# Patient Record
Sex: FEMALE | Race: WHITE | Employment: OTHER | URBAN - METROPOLITAN AREA
[De-identification: names, ages, dates, MRNs, and addresses within clinical notes are randomized per-mention and may not be internally consistent; named-entity substitution may affect disease eponyms.]

---

## 2018-10-09 ENCOUNTER — HOSPITAL ENCOUNTER (OUTPATIENT)
Age: 63
Setting detail: SPECIMEN
Discharge: HOME | End: 2018-10-09

## 2018-10-09 PROCEDURE — 86850 RBC ANTIBODY SCREEN: CPT

## 2018-10-09 PROCEDURE — 86901 BLOOD TYPING SEROLOGIC RH(D): CPT

## 2018-10-09 PROCEDURE — 86900 BLOOD TYPING SEROLOGIC ABO: CPT

## 2021-02-05 ENCOUNTER — HOSPITAL ENCOUNTER (OUTPATIENT)
Dept: CT IMAGING | Age: 66
Discharge: HOME | End: 2021-02-05
Payer: COMMERCIAL

## 2021-02-05 ENCOUNTER — HOSPITAL ENCOUNTER (OUTPATIENT)
Dept: NUCLEAR MEDICINE | Age: 66
Discharge: HOME | End: 2021-02-05
Payer: COMMERCIAL

## 2021-02-05 ENCOUNTER — HOSPITAL ENCOUNTER (OUTPATIENT)
Age: 66
Discharge: HOME | End: 2021-02-05
Payer: COMMERCIAL

## 2021-02-05 DIAGNOSIS — C50.912 INVASIVE DUCTAL CARCINOMA OF BREAST, FEMALE, LEFT (HCC): ICD-10-CM

## 2021-02-05 LAB
ALBUMIN SERPL-MCNC: 4.4 GM/DL (ref 3.4–5)
ALP BLD-CCNC: 105 IU/L (ref 40–129)
ALT SERPL-CCNC: 19 U/L (ref 10–40)
ANION GAP SERPL CALCULATED.3IONS-SCNC: 10 MMOL/L (ref 4–16)
AST SERPL-CCNC: 22 IU/L (ref 15–37)
BILIRUB SERPL-MCNC: 0.3 MG/DL (ref 0–1)
BILIRUBIN DIRECT: 0.2 MG/DL (ref 0–0.3)
BILIRUBIN, INDIRECT: 0.1 MG/DL (ref 0–0.7)
BUN BLDV-MCNC: 16 MG/DL (ref 6–23)
CALCIUM SERPL-MCNC: 9.6 MG/DL (ref 8.3–10.6)
CHLORIDE BLD-SCNC: 101 MMOL/L (ref 99–110)
CO2: 26 MMOL/L (ref 21–32)
CREAT SERPL-MCNC: 0.8 MG/DL (ref 0.6–1.1)
GFR AFRICAN AMERICAN: >60 ML/MIN/1.73M2
GFR AFRICAN AMERICAN: >60 ML/MIN/1.73M2
GFR NON-AFRICAN AMERICAN: >60 ML/MIN/1.73M2
GFR NON-AFRICAN AMERICAN: >60 ML/MIN/1.73M2
GLUCOSE BLD-MCNC: 100 MG/DL (ref 70–99)
HCT VFR BLD CALC: 39.8 % (ref 37–47)
HEMOGLOBIN: 12.8 GM/DL (ref 12.5–16)
MCH RBC QN AUTO: 29.3 PG (ref 27–31)
MCHC RBC AUTO-ENTMCNC: 32.2 % (ref 32–36)
MCV RBC AUTO: 91.1 FL (ref 78–100)
PDW BLD-RTO: 13.6 % (ref 11.7–14.9)
PLATELET # BLD: 324 K/CU MM (ref 140–440)
PMV BLD AUTO: 9.2 FL (ref 7.5–11.1)
POC CREATININE: 0.8 MG/DL (ref 0.6–1.1)
POTASSIUM SERPL-SCNC: 4.6 MMOL/L (ref 3.5–5.1)
RBC # BLD: 4.37 M/CU MM (ref 4.2–5.4)
SODIUM BLD-SCNC: 137 MMOL/L (ref 135–145)
TOTAL PROTEIN: 7.2 GM/DL (ref 6.4–8.2)
WBC # BLD: 5.5 K/CU MM (ref 4–10.5)

## 2021-02-05 PROCEDURE — 82248 BILIRUBIN DIRECT: CPT

## 2021-02-05 PROCEDURE — 78306 BONE IMAGING WHOLE BODY: CPT

## 2021-02-05 PROCEDURE — 3430000000 HC RX DIAGNOSTIC RADIOPHARMACEUTICAL: Performed by: SURGERY

## 2021-02-05 PROCEDURE — 74177 CT ABD & PELVIS W/CONTRAST: CPT

## 2021-02-05 PROCEDURE — 80053 COMPREHEN METABOLIC PANEL: CPT

## 2021-02-05 PROCEDURE — 71260 CT THORAX DX C+: CPT

## 2021-02-05 PROCEDURE — 6360000004 HC RX CONTRAST MEDICATION: Performed by: EMERGENCY MEDICINE

## 2021-02-05 PROCEDURE — 36415 COLL VENOUS BLD VENIPUNCTURE: CPT

## 2021-02-05 PROCEDURE — A9503 TC99M MEDRONATE: HCPCS | Performed by: SURGERY

## 2021-02-05 PROCEDURE — 85027 COMPLETE CBC AUTOMATED: CPT

## 2021-02-05 RX ORDER — TC 99M MEDRONATE 20 MG/10ML
26.4 INJECTION, POWDER, LYOPHILIZED, FOR SOLUTION INTRAVENOUS
Status: COMPLETED | OUTPATIENT
Start: 2021-02-05 | End: 2021-02-05

## 2021-02-05 RX ADMIN — IOPAMIDOL 80 ML: 755 INJECTION, SOLUTION INTRAVENOUS at 13:31

## 2021-02-05 RX ADMIN — IOHEXOL 50 ML: 240 INJECTION, SOLUTION INTRATHECAL; INTRAVASCULAR; INTRAVENOUS; ORAL at 13:33

## 2021-02-05 RX ADMIN — TC 99M MEDRONATE 26.4 MILLICURIE: 20 INJECTION, POWDER, LYOPHILIZED, FOR SOLUTION INTRAVENOUS at 10:50

## 2021-03-29 NOTE — PROGRESS NOTES
Patient Name:  Aditya Painter  Patient :  1955  Patient MRN:  [id number]     Primary Oncologist: Malia Rivera MD  Referring Provider: Harold Phoenix     Date of Service: 3/31/2021      Reason for Consult:  Left breast cancer     Chief Complaint:    Chief Complaint   Patient presents with    New Patient      Patient Active Problem List:     Hyperlipemia     HPI:   Manju Woodruff is a pleasant 67-year-old female patient was referred for evaluation of left breast cancer. She is a nurse and plans to retire. She was found to have an abnormal screening mammogram to the left breast.  Previous mammogram was in 2016. Diagnostic mammogram with US on 2021 showed at 1 o'clock left breast there was an irregular hypoechoic mass with associated calcifications and posterior acoustic shadowing measuring 1.4.x1.1x1.4 cm. Impression: Highly suspicious mass 1 o'clock left breast and increased calcifications upper-outer quadrant left breast.    She had US guided biopsy on 2021. Pathology report showed invasive ductal carcinoma with focal LCIS at posterior lateral tumor and 1 o'clock tumor. CBC and CMP were unremarkable. CBC and CMP in 2021 were unremarkable, except for mildly elevated alkaline phosphatase at 116. CT chest, abdomen and pelvis on 2021 was negative for metastatic disease. Bone scan in 2021 was negative. She elected to have left breast mastectomy followed by reconstruction. She has left breast mastectomy with LN biopsy on 3/8/2021. Pathology report showed : no residual cancer on mastectomy specimen. Tumor focality: 2 sites. Tumor site: Posterior lateral.  Tumor size: 4 mm. Histologic type: Invasive ductal carcinoma. Histologic grade: Grade 1  ER by IHC was more than 95%, AZ by IHC was more than 95%, HER-2/mingo by IHC was 0. Tumor site: 1:00  Tumor size: 5 mm  Histologic type:  Invasive ductal carcinoma  Histologic grade: Grade 1  ER biopsy was more than 95%, AZ by IHC was 50%, HER-2/mingo by FISH was not amplified. Margins were clear. No lymphovascular invasion. [place] lymph nodes were negative. Pathological stage T1a, N0, MX. She has bone density in December 2020 by Dr Sandra Javier and has osteopenia. She was recommended to take vitamin D and calcium. We went over NCCN guideline which recommends to consider adjuvant endocrine therapy. She is agreeable to arimidex. I gave prescription of arimidex on March 31, 2021. We discussed about risk and benefit of arimidex. I recommend yearly mammogram and bone density every 2 years. She has 2 daughters. She is interested in genetic counseling. She has chronic osteoarthritic pain and been taking celebrex. She had positive COVID in 2020 and had COVID vaccine. Past Medical History:    Depression      GERD (gastroesophageal reflux disease)      Hyperlipidemia      Pap smear for cervical cancer screening 01/08/2009     Neg    Pap smear for cervical cancer screening 01/28/2010     Neg    Pap smear for cervical cancer screening 07/14/2011     Neg    Rectocele      Past Surgery History:     COLONOSCOPY   11/2008    DILATION AND CURETTAGE OF UTERUS   1985    JOINT REPLACEMENT Left 2019     partial knee    PELVIC LAPAROSCOPY   1985    TUBAL LIGATION   1991     Social History:   Tobacco Use    Smoking status: Former Smoker    Smokeless tobacco: Never Used   Substance Use Topics    Alcohol use: Yes    -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  She has her first grandchild who was born in July, 2020 as well    Family History:    Heart Failure Father      Heart Disease Father      High Cholesterol Father      Stroke Mother      Heart Failure Maternal Grandmother      Hypertension Maternal Grandmother      High Blood Pressure Maternal Grandmother      Cancer Paternal Grandmother      Diabetes Paternal Grandmother      Allergies:  No Known Allergies    Current Outpatient Medications on File Prior to Visit Medication Sig Dispense Refill    cephALEXin (KEFLEX) 500 MG capsule TAKE 1 CAPSULE BY MOUTH THREE TIMES DAILY      omeprazole (PRILOSEC) 40 MG delayed release capsule TAKE 1 CAPSULE BY MOUTH DAILY      HYDROcodone-acetaminophen (NORCO) 5-325 MG per tablet Take 1 tablet by mouth every 6 hours as needed for Pain for up to 7 days. Intended supply: 5 days. Take lowest dose possible to manage pain 28 tablet 0    celecoxib (CELEBREX) 200 MG capsule Take 200 mg by mouth 2 times daily      gabapentin (NEURONTIN) 300 MG capsule Take 300 mg by mouth 3 times daily.  atorvastatin (LIPITOR) 40 MG tablet       FLUoxetine (PROZAC) 20 MG capsule        No current facility-administered medications on file prior to visit. Review of Systems:    Constitutional:  No weight loss, No fever, No chills, No night sweats. Energy level good. Eyes:  No diplopia, No transient or permanent loss of vision, No scotomata. ENT / Mouth:  No epistaxis, No dysphagia, No hoarseness, No oral ulcers, No gingival bleeding. No sore throat, No postnasal drip, No nasal drip, No mouth pain, No sinus pain, No tinnitus, Normal hearing. Cardiovascular:  No chest pain, No palpitations, No syncope, No upper extremity edema, No lower extremity edema, No calf discomfort. Respiratory:  No cough. No hemoptysis, No pleurisy, No wheezing, No dyspnea. Breast:  No breast mass, No pain, No nipple discharge, No change in size, No change in shape. Gastrointestinal:  No abdominal pain, No abdominal cramping, No nausea, No vomiting, No constipation, No diarrhea, No hematochezia, No melena, No jaundice, No dyspepsia, No dysphagia. Urinary:  No dysuria, No hematuria, No urinary incontinence. Gynecological:  No vaginal discharge, No suprapubic pain, No abnormal vaginal bleeding. (Female Patients Only)  Musculoskeletal:  No muscle pain, No swollen joints, No joint redness, No bone pain, No spine tenderness.   Skin:  No rash, No nodules, No pruritus, No lesions. Neurologic:  No confusion, No seizures, No syncope, No tremor, No speech change, No headache, No hiccups, No abnormal gait, No sensory changes, No weakness. Psychiatric:  No depression, No anxiety, Concentration normal.  Endocrine:  No polyuria, No polydipsia, No hot flashes, No thyroid symptoms. Hematologic:  No epistaxis, No gingival bleeding, No petechiae, No ecchymosis. Lymphatic:  No lymphadenopathy, No lymphedema. Allergy / Immunologic:  No eczema, No frequent mucous infections, No frequent respiratory infections, No recurrent urticarial, No frequent skin infections. Vital Signs: /67 (Site: Right Upper Arm, Position: Sitting, Cuff Size: Large Adult)   Pulse 83   Temp 97.4 °F (36.3 °C) (Temporal)   Ht 5' 2\" (1.575 m)   Wt 186 lb 3.2 oz (84.5 kg)   SpO2 98%   BMI 34.06 kg/m²      Physical Exam:  CONSTITUTIONAL: awake, alert, cooperative, no apparent distress   EYES: pupils equal, round and reactive to light, sclera clear and conjunctiva normal  ENT: Normocephalic, without obvious abnormality, atraumatic  NECK: supple, symmetrical, no jugular venous distension and no carotid bruits   HEMATOLOGIC/LYMPHATIC: no cervical, supraclavicular or axillary lymphadenopathy   LUNGS: no increased work of breathing and clear to auscultation   BREAST: s/p left breast mastectomy with expander. Bruises noted. CARDIOVASCULAR: regular rate and rhythm, normal S1 and S2, no murmur noted  ABDOMEN: normal bowel sounds x 4, soft, non-distended, non-tender, no masses palpated, no hepatosplenomegaly   MUSCULOSKELETAL: full range of motion noted, tone is normal  NEUROLOGIC: awake, alert, oriented to name, place and time. Motor skills grossly intact. SKIN: Normal skin color, texture, turgor and no jaundice.  appears intact   EXTREMITIES: no LE edema        Labs:  Hematology:  Lab Results   Component Value Date    WBC 5.5 02/05/2021    RBC 4.37 02/05/2021    HGB 12.8 02/05/2021    HCT 39.8 02/05/2021    MCV 91.1 02/05/2021    MCH 29.3 02/05/2021    MCHC 32.2 02/05/2021    RDW 13.6 02/05/2021     02/05/2021    MPV 9.2 02/05/2021     Chemistry:  Lab Results   Component Value Date     02/05/2021    K 4.6 02/05/2021     02/05/2021    CO2 26 02/05/2021    BUN 16 02/05/2021    CREATININE 0.8 02/05/2021    GLUCOSE 100 (H) 02/05/2021    CALCIUM 9.6 02/05/2021    PROT 7.2 02/05/2021    LABALBU 4.4 02/05/2021    BILITOT 0.3 02/05/2021    ALKPHOS 105 02/05/2021    AST 22 02/05/2021    ALT 19 02/05/2021    LABGLOM >60 02/05/2021    GFRAA >60 02/05/2021     Immunology:  Lab Results   Component Value Date    PROT 7.2 02/05/2021      Observations:  PHQ-9 Total Score: 1 (3/31/2021 11:29 AM)     Assessment & Plan:  1. She has two small invasive ductal carcinoma of left breast, 4 mm and 5 mm, ER/DC positive and Her-mingo negative s/p mastectomy. She is agreeable to adjuvant arimidex   She will start on March 31, 2021. We discussed about survivorship. Clinically she is in remission. I recommend yearly mammogram.     2. She has osteopenia. Bone density was in December 2020.    3. I referred to Belén Diaz for genetic counseling. 4. She has osteoarthritis and takes celebrex. We discussed about healthy diet and life styel. She had Covid vaccine. RTC in 6 weeks or sooner. All of her questions have been answered for today. I have discussed the above stated plan with the patient and they verbalized understanding and agreed with the plan. Thank you for allowing us to participate in this patient's care.       Electronically signed by Quinn Roman MD on 3/29/21 at 7:19 AM EDT

## 2021-03-31 ENCOUNTER — INITIAL CONSULT (OUTPATIENT)
Dept: ONCOLOGY | Age: 66
End: 2021-03-31
Payer: COMMERCIAL

## 2021-03-31 ENCOUNTER — CLINICAL DOCUMENTATION (OUTPATIENT)
Dept: CASE MANAGEMENT | Age: 66
End: 2021-03-31

## 2021-03-31 ENCOUNTER — HOSPITAL ENCOUNTER (OUTPATIENT)
Dept: INFUSION THERAPY | Age: 66
Discharge: HOME | End: 2021-03-31
Payer: COMMERCIAL

## 2021-03-31 VITALS
DIASTOLIC BLOOD PRESSURE: 67 MMHG | BODY MASS INDEX: 34.27 KG/M2 | SYSTOLIC BLOOD PRESSURE: 138 MMHG | HEIGHT: 62 IN | HEART RATE: 83 BPM | TEMPERATURE: 97.4 F | OXYGEN SATURATION: 98 % | WEIGHT: 186.2 LBS

## 2021-03-31 DIAGNOSIS — C50.412 MALIGNANT NEOPLASM OF UPPER-OUTER QUADRANT OF LEFT BREAST IN FEMALE, ESTROGEN RECEPTOR POSITIVE (HCC): Primary | ICD-10-CM

## 2021-03-31 DIAGNOSIS — Z17.0 MALIGNANT NEOPLASM OF UPPER-OUTER QUADRANT OF LEFT BREAST IN FEMALE, ESTROGEN RECEPTOR POSITIVE (HCC): Primary | ICD-10-CM

## 2021-03-31 PROCEDURE — 99205 OFFICE O/P NEW HI 60 MIN: CPT | Performed by: INTERNAL MEDICINE

## 2021-03-31 PROCEDURE — 99202 OFFICE O/P NEW SF 15 MIN: CPT

## 2021-03-31 RX ORDER — OMEPRAZOLE 40 MG/1
CAPSULE, DELAYED RELEASE ORAL
COMMUNITY
Start: 2021-03-15

## 2021-03-31 RX ORDER — CEPHALEXIN 500 MG/1
CAPSULE ORAL
COMMUNITY
Start: 2021-03-19 | End: 2021-04-14 | Stop reason: ALTCHOICE

## 2021-03-31 RX ORDER — ANASTROZOLE 1 MG/1
1 TABLET ORAL DAILY
Qty: 90 TABLET | Refills: 1 | Status: SHIPPED | OUTPATIENT
Start: 2021-03-31 | End: 2021-05-14 | Stop reason: SDUPTHER

## 2021-03-31 ASSESSMENT — PATIENT HEALTH QUESTIONNAIRE - PHQ9
SUM OF ALL RESPONSES TO PHQ9 QUESTIONS 1 & 2: 1
2. FEELING DOWN, DEPRESSED OR HOPELESS: 1
SUM OF ALL RESPONSES TO PHQ QUESTIONS 1-9: 1
1. LITTLE INTEREST OR PLEASURE IN DOING THINGS: 0
SUM OF ALL RESPONSES TO PHQ QUESTIONS 1-9: 1

## 2021-03-31 NOTE — PROGRESS NOTES
[place] Rooming Questions  Patient: Martine Carrion  MRN: [id number]    Date: 3/31/2021      NEW PATIENT     5. Did the patient have a depression screening completed today?  Yes    PHQ-9 Total Score: 1 (3/31/2021 11:29 AM)       PHQ-9 Given to (if applicable):               PHQ-9 Score (if applicable):                     [] Positive     []  Negative              Does question #9 need addressed (if applicable)                     [] Yes    []  No               Shane De Souza MA

## 2021-03-31 NOTE — PROGRESS NOTES
Patient here today for consult with Dr. Barrera Babcock. Patient recently diagnosed with early stage left breast invasive ductal carcinoma, ER/DC positive, HER-2/mingo negative by FISH. Patient had a left mastectomy/SN bxs on 3/8/21 which showed no residual cancer and benign lymph nodes. No chemotherapy or radiation recommended. Patient will be starting on an aromatase inhibitor. Per Dr. Cami Conley, Arimidex 1 mg one tab daily e-scribed to patient's pharmacy. Side effects reviewed with patient and educational printout given. Direct contact information given to call with any questions/concerns. Patient voiced understanding of all.

## 2021-04-14 ENCOUNTER — HOSPITAL ENCOUNTER (OUTPATIENT)
Age: 66
Setting detail: SPECIMEN
Discharge: HOME | End: 2021-04-14
Payer: COMMERCIAL

## 2021-04-14 PROCEDURE — 87077 CULTURE AEROBIC IDENTIFY: CPT

## 2021-04-14 PROCEDURE — 87075 CULTR BACTERIA EXCEPT BLOOD: CPT

## 2021-04-14 PROCEDURE — 87070 CULTURE OTHR SPECIMN AEROBIC: CPT

## 2021-04-14 PROCEDURE — 87186 SC STD MICRODIL/AGAR DIL: CPT

## 2021-04-15 NOTE — PROGRESS NOTES
Patient Name:  Nabila Posadas  Patient :  1955  Patient MRN:  [id number]     Primary Oncologist: Eddie Moreau MD  Referring Provider: Jaya Potts     Date of Service: 2021      Chief Complaint:    No chief complaint on file. She came in for follow-up visit. Patient Active Problem List:     Hyperlipemia     HPI:   Michele Jimenez is a pleasant 44-year-old female patient was referred for evaluation of left breast cancer. She is a nurse and plans to retire. She was found to have an abnormal screening mammogram to the left breast.  Previous mammogram was in 2016. Diagnostic mammogram with US on 2021 showed at 1 o'clock left breast there was an irregular hypoechoic mass with associated calcifications and posterior acoustic shadowing measuring 1.4.x1.1x1.4 cm. Impression: Highly suspicious mass 1 o'clock left breast and increased calcifications upper-outer quadrant left breast.    She had US guided biopsy on 2021. Pathology report showed invasive ductal carcinoma with focal LCIS at posterior lateral tumor and 1 o'clock tumor. CBC and CMP were unremarkable. CBC and CMP in 2021 were unremarkable, except for mildly elevated alkaline phosphatase at 116. CT chest, abdomen and pelvis on 2021 was negative for metastatic disease. Bone scan in 2021 was negative. She elected to have left breast mastectomy followed by reconstruction. She has left breast mastectomy with LN biopsy on 3/8/2021. Pathology report showed : no residual cancer on mastectomy specimen. Tumor focality: 2 sites. Tumor site: Posterior lateral.  Tumor size: 4 mm. Histologic type: Invasive ductal carcinoma. Histologic grade: Grade 1  ER by IHC was more than 95%, ND by IHC was more than 95%, HER-2/mingo by IHC was 0. Tumor site: 1:00  Tumor size: 5 mm  Histologic type:  Invasive ductal carcinoma  Histologic grade: Grade 1  ER biopsy was more than 95%, ND by IHC was 50%, HER-2/mingo by FISH was not amplified. Margins were clear. No lymphovascular invasion. [place] lymph nodes were negative. Pathological stage T1a, N0, MX. She has bone density in December 2020 by Dr Bonnie Phillips and has osteopenia. She was recommended to take vitamin D and calcium. We went over NCCN guideline which recommends to consider adjuvant endocrine therapy. She is agreeable to arimidex. I gave prescription of arimidex on March 31, 2021. We discussed about risk and benefit of arimidex. I recommend yearly mammogram and bone density every 2 years. She has 2 daughters. She is interested in genetic counseling. She has chronic osteoarthritic pain and been taking celebrex. She had positive COVID in 2020 and had COVID vaccine. On May 14, 2021 she came in for follow-up visit. She delay her appointment to see Robinson Puckett to discuss about genetic counseling due to the removal of breast implant. She will reschedule it. She had surgery 3 times to her breast prior to next office visit which may explain her anemia. I will have anemic work-up prior to next office visit. She has hot flashes. So far she has been tolerating Arimidex well. I will give refill of the Arimidex. She plans to go to [place] for vacation. She denied any nausea, vomiting or diarrhea. No fever or chills. No dysuria or hematuria. No headaches or dizzy spell.     Past Medical History:    Depression      GERD (gastroesophageal reflux disease)      Hyperlipidemia      Pap smear for cervical cancer screening 01/08/2009     Neg    Pap smear for cervical cancer screening 01/28/2010     Neg    Pap smear for cervical cancer screening 07/14/2011     Neg    Rectocele      Past Surgery History:     COLONOSCOPY   11/2008    DILATION AND CURETTAGE OF UTERUS   1985    JOINT REPLACEMENT Left 2019     partial knee    PELVIC LAPAROSCOPY   1985    TUBAL LIGATION   1991     Social History:   Tobacco Use    Smoking status: Former Smoker    Smokeless tobacco: Never Used   Substance Use Topics    Alcohol use: Yes    -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 She has her first grandchild who was born in July, 2020 as well    Family History:    Heart Failure Father      Heart Disease Father      High Cholesterol Father      Stroke Mother      Heart Failure Maternal Grandmother      Hypertension Maternal Grandmother      High Blood Pressure Maternal Grandmother      Cancer Paternal Grandmother      Diabetes Paternal Grandmother       Review of Systems: The remainder of the review of system is unremarkable. Vital Signs: There were no vitals taken for this visit. Physical Exam:  CONSTITUTIONAL: awake, alert, cooperative, no apparent distress   EYES: pupils equal, round and reactive to light, sclera clear and conjunctiva normal  ENT: Normocephalic, without obvious abnormality, atraumatic  NECK: supple, symmetrical, no jugular venous distension and no carotid bruits   HEMATOLOGIC/LYMPHATIC: no cervical, supraclavicular or axillary lymphadenopathy   LUNGS: no increased work of breathing and clear to auscultation   BREAST: s/p left breast mastectomy  CARDIOVASCULAR: regular rate and rhythm, normal S1 and S2, no murmur noted  ABDOMEN: normal bowel sounds, soft, non-distended, non-tender, no masses palpated, no hepatosplenomegaly   MUSCULOSKELETAL: full range of motion noted, tone is normal  NEUROLOGIC: awake, alert, oriented to name, place and time. Motor skills grossly intact. Cranial nerves II through XII grossly intact. SKIN: Normal skin color, texture, turgor and no jaundice. appears intact   EXTREMITIES: no LE edema  or cyanosis.      Labs:  Hematology:  Lab Results   Component Value Date    WBC 5.5 02/05/2021    RBC 4.37 02/05/2021    HGB 12.8 02/05/2021    HCT 39.8 02/05/2021    MCV 91.1 02/05/2021    MCH 29.3 02/05/2021    MCHC 32.2 02/05/2021    RDW 13.6 02/05/2021     02/05/2021    MPV 9.2 02/05/2021     Chemistry:  Lab Results   Component Value Date     02/05/2021    K 4.6 02/05/2021     02/05/2021    CO2 26 02/05/2021    BUN 16 02/05/2021    CREATININE 0.8 02/05/2021    GLUCOSE 100 (H) 02/05/2021    CALCIUM 9.6 02/05/2021    PROT 7.2 02/05/2021    LABALBU 4.4 02/05/2021    BILITOT 0.3 02/05/2021    ALKPHOS 105 02/05/2021    AST 22 02/05/2021    ALT 19 02/05/2021    LABGLOM >60 02/05/2021    GFRAA >60 02/05/2021     Immunology:  Lab Results   Component Value Date    PROT 7.2 02/05/2021      Observations:  No data recorded     Assessment & Plan:  1. She has two small invasive ductal carcinoma of left breast, 4 mm and 5 mm, ER/W[place] positive and Her-mingo negative s/p mastectomy. She is agreeable to adjuvant arimidex   She started Arimidex on March 31, 2021. No grade 3 toxicity. Clinically she is in remission. I recommend yearly mammogram.  I gave refill for Arimidex. 2. She has osteopenia. Bone density was in December 2020. She will take vitamin D and calcium. 3. I referred to Robinson Puckett for genetic counseling. 4. She has osteoarthritis and takes celebrex. We discussed about healthy diet and life styel. She had Covid vaccine. RTC in 12 weeks or sooner. All of her questions have been answered for today.      Electronically signed by Damián Bae MD on 3/29/21 at 7:19 AM LOIDAT

## 2021-04-20 LAB
CULTURE: ABNORMAL
CULTURE: ABNORMAL
Lab: ABNORMAL
SPECIMEN: ABNORMAL

## 2021-05-07 ENCOUNTER — HOSPITAL ENCOUNTER (OUTPATIENT)
Dept: INFUSION THERAPY | Age: 66
Discharge: HOME | End: 2021-05-07
Payer: COMMERCIAL

## 2021-05-07 DIAGNOSIS — Z17.0 MALIGNANT NEOPLASM OF UPPER-OUTER QUADRANT OF LEFT BREAST IN FEMALE, ESTROGEN RECEPTOR POSITIVE (HCC): ICD-10-CM

## 2021-05-07 DIAGNOSIS — C50.412 MALIGNANT NEOPLASM OF UPPER-OUTER QUADRANT OF LEFT BREAST IN FEMALE, ESTROGEN RECEPTOR POSITIVE (HCC): ICD-10-CM

## 2021-05-07 LAB
ALBUMIN SERPL-MCNC: 4.7 GM/DL (ref 3.4–5)
ALP BLD-CCNC: 107 IU/L (ref 40–129)
ALT SERPL-CCNC: 16 U/L (ref 10–40)
ANION GAP SERPL CALCULATED.3IONS-SCNC: 8 MMOL/L (ref 4–16)
AST SERPL-CCNC: 21 IU/L (ref 15–37)
BASOPHILS ABSOLUTE: 0 K/CU MM
BASOPHILS RELATIVE PERCENT: 0.5 % (ref 0–1)
BILIRUB SERPL-MCNC: 0.3 MG/DL (ref 0–1)
BUN BLDV-MCNC: 17 MG/DL (ref 6–23)
CALCIUM SERPL-MCNC: 10.3 MG/DL (ref 8.3–10.6)
CHLORIDE BLD-SCNC: 99 MMOL/L (ref 99–110)
CO2: 29 MMOL/L (ref 21–32)
CREAT SERPL-MCNC: 0.8 MG/DL (ref 0.6–1.1)
DIFFERENTIAL TYPE: ABNORMAL
EOSINOPHILS ABSOLUTE: 0.1 K/CU MM
EOSINOPHILS RELATIVE PERCENT: 1.2 % (ref 0–3)
GFR AFRICAN AMERICAN: >60 ML/MIN/1.73M2
GFR NON-AFRICAN AMERICAN: >60 ML/MIN/1.73M2
GLUCOSE BLD-MCNC: 118 MG/DL (ref 70–99)
HCT VFR BLD CALC: 32.6 % (ref 37–47)
HEMOGLOBIN: 10.4 GM/DL (ref 12.5–16)
LYMPHOCYTES ABSOLUTE: 1.8 K/CU MM
LYMPHOCYTES RELATIVE PERCENT: 31.3 % (ref 24–44)
MCH RBC QN AUTO: 27.3 PG (ref 27–31)
MCHC RBC AUTO-ENTMCNC: 31.9 % (ref 32–36)
MCV RBC AUTO: 85.6 FL (ref 78–100)
MONOCYTES ABSOLUTE: 0.6 K/CU MM
MONOCYTES RELATIVE PERCENT: 10.5 % (ref 0–4)
PDW BLD-RTO: 13.6 % (ref 11.7–14.9)
PLATELET # BLD: 329 K/CU MM (ref 140–440)
PMV BLD AUTO: 8.8 FL (ref 7.5–11.1)
POTASSIUM SERPL-SCNC: 4.8 MMOL/L (ref 3.5–5.1)
RBC # BLD: 3.81 M/CU MM (ref 4.2–5.4)
SEGMENTED NEUTROPHILS ABSOLUTE COUNT: 3.2 K/CU MM
SEGMENTED NEUTROPHILS RELATIVE PERCENT: 56.5 % (ref 36–66)
SODIUM BLD-SCNC: 136 MMOL/L (ref 135–145)
TOTAL PROTEIN: 7 GM/DL (ref 6.4–8.2)
WBC # BLD: 5.7 K/CU MM (ref 4–10.5)

## 2021-05-07 PROCEDURE — 85025 COMPLETE CBC W/AUTO DIFF WBC: CPT

## 2021-05-07 PROCEDURE — 36415 COLL VENOUS BLD VENIPUNCTURE: CPT

## 2021-05-07 PROCEDURE — 80053 COMPREHEN METABOLIC PANEL: CPT

## 2021-05-14 ENCOUNTER — OFFICE VISIT (OUTPATIENT)
Dept: ONCOLOGY | Age: 66
End: 2021-05-14
Payer: COMMERCIAL

## 2021-05-14 ENCOUNTER — HOSPITAL ENCOUNTER (OUTPATIENT)
Dept: INFUSION THERAPY | Age: 66
Discharge: HOME | End: 2021-05-14
Payer: COMMERCIAL

## 2021-05-14 VITALS
TEMPERATURE: 96.4 F | SYSTOLIC BLOOD PRESSURE: 142 MMHG | WEIGHT: 185.8 LBS | OXYGEN SATURATION: 97 % | DIASTOLIC BLOOD PRESSURE: 67 MMHG | HEIGHT: 62 IN | BODY MASS INDEX: 34.19 KG/M2 | HEART RATE: 105 BPM

## 2021-05-14 DIAGNOSIS — D64.9 ANEMIA, UNSPECIFIED TYPE: Primary | ICD-10-CM

## 2021-05-14 PROCEDURE — 99213 OFFICE O/P EST LOW 20 MIN: CPT | Performed by: INTERNAL MEDICINE

## 2021-05-14 PROCEDURE — 99211 OFF/OP EST MAY X REQ PHY/QHP: CPT

## 2021-05-14 RX ORDER — ANASTROZOLE 1 MG/1
1 TABLET ORAL DAILY
Qty: 90 TABLET | Refills: 1 | Status: SHIPPED | OUTPATIENT
Start: 2021-05-14 | End: 2021-11-22 | Stop reason: SDUPTHER

## 2021-05-14 ASSESSMENT — PATIENT HEALTH QUESTIONNAIRE - PHQ9
SUM OF ALL RESPONSES TO PHQ QUESTIONS 1-9: 1
SUM OF ALL RESPONSES TO PHQ9 QUESTIONS 1 & 2: 1

## 2021-05-14 NOTE — PROGRESS NOTES
[place] Rooming Questions  Patient: Tomer Mcintosh  MRN: [id number]    Date: 5/14/2021        1. Do you have any new issues? yes - patient had her breast implant removed @[facility]     2. Do you need any refills on medications?    no    3. Have you had any imaging done since your last visit? yes - 05/7      4. Have you been hospitalized or seen in the emergency room since your last visit here?   no    5. Did the patient have a depression screening completed today?  Yes    PHQ-9 Total Score: 1 (5/14/2021 10:48 AM)       PHQ-9 Given to (if applicable):               PHQ-9 Score (if applicable):                     [] Positive     []  Negative              Does question #9 need addressed (if applicable)                     [] Yes    []  No               Caty Ambrose CMA

## 2021-06-09 DIAGNOSIS — C50.412 MALIGNANT NEOPLASM OF UPPER-OUTER QUADRANT OF LEFT BREAST IN FEMALE, ESTROGEN RECEPTOR POSITIVE (HCC): Primary | ICD-10-CM

## 2021-06-09 DIAGNOSIS — Z17.0 MALIGNANT NEOPLASM OF UPPER-OUTER QUADRANT OF LEFT BREAST IN FEMALE, ESTROGEN RECEPTOR POSITIVE (HCC): Primary | ICD-10-CM

## 2021-06-28 ENCOUNTER — INITIAL CONSULT (OUTPATIENT)
Dept: ONCOLOGY | Age: 66
End: 2021-06-28
Payer: MEDICARE

## 2021-06-28 ENCOUNTER — HOSPITAL ENCOUNTER (OUTPATIENT)
Dept: INFUSION THERAPY | Age: 66
Discharge: HOME | End: 2021-06-28
Payer: MEDICARE

## 2021-06-28 VITALS
TEMPERATURE: 96.8 F | BODY MASS INDEX: 32.57 KG/M2 | OXYGEN SATURATION: 97 % | WEIGHT: 183.8 LBS | HEART RATE: 106 BPM | HEIGHT: 63 IN | SYSTOLIC BLOOD PRESSURE: 134 MMHG | DIASTOLIC BLOOD PRESSURE: 91 MMHG

## 2021-06-28 DIAGNOSIS — C50.412 MALIGNANT NEOPLASM OF UPPER-OUTER QUADRANT OF LEFT BREAST IN FEMALE, ESTROGEN RECEPTOR POSITIVE (HCC): Primary | ICD-10-CM

## 2021-06-28 DIAGNOSIS — Z17.0 MALIGNANT NEOPLASM OF UPPER-OUTER QUADRANT OF LEFT BREAST IN FEMALE, ESTROGEN RECEPTOR POSITIVE (HCC): Primary | ICD-10-CM

## 2021-06-28 PROCEDURE — G8427 DOCREV CUR MEDS BY ELIG CLIN: HCPCS | Performed by: NURSE PRACTITIONER

## 2021-06-28 PROCEDURE — 1090F PRES/ABSN URINE INCON ASSESS: CPT | Performed by: NURSE PRACTITIONER

## 2021-06-28 PROCEDURE — G8400 PT W/DXA NO RESULTS DOC: HCPCS | Performed by: NURSE PRACTITIONER

## 2021-06-28 PROCEDURE — G8417 CALC BMI ABV UP PARAM F/U: HCPCS | Performed by: NURSE PRACTITIONER

## 2021-06-28 PROCEDURE — 1036F TOBACCO NON-USER: CPT | Performed by: NURSE PRACTITIONER

## 2021-06-28 PROCEDURE — 1123F ACP DISCUSS/DSCN MKR DOCD: CPT | Performed by: NURSE PRACTITIONER

## 2021-06-28 PROCEDURE — 3017F COLORECTAL CA SCREEN DOC REV: CPT | Performed by: NURSE PRACTITIONER

## 2021-06-28 PROCEDURE — 99215 OFFICE O/P EST HI 40 MIN: CPT | Performed by: NURSE PRACTITIONER

## 2021-06-28 PROCEDURE — 4040F PNEUMOC VAC/ADMIN/RCVD: CPT | Performed by: NURSE PRACTITIONER

## 2021-06-28 NOTE — PROGRESS NOTES
[facility]     Cancer Treatment Summary and  Survivorship Care Plan    Provided by CRYSTAL Varma on 06/28/21        This Cancer Treatment Summary/Survivorship Care Plan is a brief record of major aspects of your cancer treatment. The care plan provides a way for a cancer survivor to review and retain information about their cancer, cancer treatment, and follow-up care. The care plan is also meant to provide basic information about a survivor's care to future healthcare providers. You can share your copy with any of your doctors or nurses; however, this is not a detailed or comprehensive record of your care. General Information   Patient Name John Paul Nunez    Patient ID [id number]    Address [place] [address]   Phone [phone] (home) [phone] (work)   Date of Birth 1955    Age 73 y.o. Support Contact Extended Emergency Contact Information  Primary Emergency Contact: Manuel Bui  Address: [address] [place] [address] Phone: [phone]  Mobile Phone: [phone]  Relation: [facility]   Medical Oncologist Dr. El Shepherd - Phone:  [phone]   Radiation Oncologist n/a   Surgeon   Dr. Patrick Morgan - Phone:  [phone]   Primary Care Physician Tim, Phone:  [phone]   Nurse Navigator Caridad Aguirre RN, BSN, CN-BN, Nurse Navigator - Phone:  [phone]        Cancer Diagnosis Information    Cancer Type/Location       Histologic Subtype/Pathology Results   Left breast invasive ductal carcinoma, focal lobular carcinoma in situ, grade 1, estrogen positive, progesterone positive, HER-2 negative. Diagnosis Date   1/15/21   Age at Diagnosis 60 y.o.    Stage at Diagnosis   Cancer Staging:    Malignant neoplasm of upper-outer quadrant of left breast in female, estrogen receptor positive ([facility] [address]  Staging form:  Breast, AJCC 8th Edition   - Pathological:  Stage IA (pT1a, pN0, pMX, G1, ER+,        ND+, HER-2-)     Surgical Procedure/Location/  Findings   Left breast biopsy done on 1/15/21 revealed invasive ductal carcinoma and focal lobular carcinoma in situ, ER/ND+, HER-2 negative. Left mastectomy with sentinel lymph node biopsies and immediate reconstruction done on 3/8/21 at [facility].  Final pathology revealed no residual carcinoma, two sites (4 mm and 5 mm), both grade 1, ER/ND positive, HER-2 negative. Margins were uninvolved and lymph nodes were negative.          Background Information  NCCN BREAST GUIDELINES         []  Patient has already completed genetic counseling  [x]  Patient needs to be screened for genetics below     NCCN Guidelines for Genetic Testing     Single Indication     Breast Cancer (Including DCIS) diagnosis ? age 39                      [] Yes                 [x] No  Triple Negative Breast Cancer diagnosed ? age 62                       [] Yes                 [x] No  HER2 - negative metastatic breast cancer diagnosed at any age  [] Yes                 [x] No  Male breast cancer diagnosed at any age                                      [] Yes                 [x] No  A known mutation in the family                                                       [] Yes                 [x] No  Ashkenazi Adventism                                                                           [] Yes                 [x] No  BRCA1 or 2 mutation detected on tumor testing                            [] Yes                 [x] No     Family History Indication     Breast Cancer diagnosis ? age 48                                                 [] Yes                 [x] No                AND an additional breast cancer primary                           [] Yes                OR an unknown or limited family history (adopted)            [] Yes                OR One or more relatives with any of the following:          [] Yes              Breast Cancer, Prostate [place] Score ? 7     Breast cancer diagnosis at any age                                               [x] Yes                 [] No                AND One or more relatives with any of the following:        [] Yes              Breast cancer ? age 48, Ovarian cancer at any age, Male              Breast cancer at any age, Pancreatic cancer, metastatic              Prostate cancer                OR Two additional diagnosis of breast cancer at any        [] Yes              Age in patient and/or in close blood relatives     Results of NCCN Guidelines     Patient should be referred for genetic counseling                    [] Yes                 [x] No    Has this patient had genetic testing previously completed? [] Yes                 [x] No       *NCCN Guidelines for Genetic Testing     (v3.2019 BRCA-related Breast and/or Ovarian cancer syndrome (BRCA 1/2 Testing Criteria) BRCA 1 or 2 mutation detected on tumor testing)   Genetic/Hereditary Risk Factor(s) or Predisposing Conditions   Cancer-related family history includes Cancer in her paternal grandmother.    Genetic Counseling Performed?   no   Genetic Testing Results   n/a   Medical History   Past Medical History:   Diagnosis Date    Arthritis     Depression     GERD (gastroesophageal reflux disease)     Hyperlipidemia     Pap smear for cervical cancer screening 01/08/2009    Neg    Pap smear for cervical cancer screening 01/28/2010    Neg    Pap smear for cervical cancer screening 07/14/2011    Neg    Rectocele       Surgical History    Past Surgical History:   Procedure Laterality Date    BREAST BIOPSY      COLONOSCOPY  11/2008    DILATION AND CURETTAGE OF UTERUS  1985    JOINT REPLACEMENT Left 2019    partial knee    KNEE SURGERY      MASTECTOMY, PARTIAL Left 03/08/2021    PELVIC LAPAROSCOPY  1985    TUBAL LIGATION  1991        Treatment Summary CHEMOTHERAPY no   [No treatment plan]  Treatment Regimen   Not recommended. Dates of Treatment    n/a   Cumulative Dose Lifetime Dose Tracking   N/A         RADIATION no     Anatomical Area Treated by Radiation    Not recommended - had mastectomy. Dates of Treatment    n/a     SURGERY/CHEMOTHERAPY/  RADIATION      Treatment-related hospitalization required?   no     Ongoing toxicity or side-effects of all treatments received (surgery, systemic therapy and/or radiation) at the completion of treatment. n/a     Information concerning the likely course of recovery from these toxicities. n/a       Ongoing Treatment    MEDICATION ALLERGIES No Known Allergies   CURRENT MEDICATION LIST       Current Outpatient Medications   Medication Sig Dispense Refill    anastrozole (ARIMIDEX) 1 MG tablet Take 1 tablet by mouth daily 90 tablet 1    Mastectomy Bra MISC by Does not apply route 4 each 0    Breast Prosthesis MISC by Does not apply route 1 each 0    omeprazole (PRILOSEC) 40 MG delayed release capsule TAKE 1 CAPSULE BY MOUTH DAILY      celecoxib (CELEBREX) 200 MG capsule Take 200 mg by mouth 2 times daily      gabapentin (NEURONTIN) 300 MG capsule Take 300 mg by mouth 3 times daily.  atorvastatin (LIPITOR) 40 MG tablet       FLUoxetine (PROZAC) 20 MG capsule        No current facility-administered medications for this visit. Need for Ongoing (Adjuvant) Treatment for Cancer?   yes - adjuvant endocrine therapy   Additional treatment name Planned duration Possible Side effects   Arimidex (anastrozole) 5 years or as directed by Medical Oncologist Possible Side Effects of Anastrozole (Arimidex):  Side effects may include weakness, fatigue, headache, mood swings, depression, nausea, mild diarrhea, increased or decreased appetite, sweating, hot flashes, vaginal dryness, temporary hair thinning, joint pain, bone pain and weakness, and lower bone density.       Follow-Up Care Plan    Continue all standard non-cancer related health care with your Primary Care Provider. Any new, unusual and/or persistent symptoms should be brought to the attention of your provider. Primary Care Physician Tim, [phone]    Coordinating Provider When / How Often? Medical Oncology   Visits    Dr. Joe Mcleod  Phone:  [phone] Every 3-6 months for 3 years, then every 6-12 months until 5 years or completion of therapy. These visits may be alternated with your medical, surgical, and/or radiation oncologist.    Next appointment day/time is 8/13/21 at 11:15 am.   General Surgeon   Visits    Dr. Ovidio Clark  Phone:  [phone] Next appointment day/time is 9/22/21 at 1:00 pm.                Cancer Surveillance or Other                 Recommended Tests When/Where Scheduled? Lab Tests   As indicated by Medical Oncologist. As indicated by provider. Imaging   Mammogram - Annually or as stated per oncology team.    Pap/Pelvic Exam - As indicated by provider. Colonoscopy - As indicated by provider. Bone Density - Every 2 years if on an aromatase inhibitor or as indicated by your provider. Needs or Concerns for Survivors After Treatment   Additional Services Offered:  Cancer survivors may experience issues with the areas listed below. If you have any concerns in these or other areas, please speak with your doctors or nurses to find out how you can get help with them.  -emotional and mental health      -fatigue          -weight changes        -stopping smoking  -physical functioning                    -insurance       -school/work             -parenting  -financial advice or assistance     -fertility                     -sexual functioning  -memory or concentration loss       Healthy Lifestyle Changes:  A number of lifestyle/behaviors can affect your on-going health, including the risk for the cancer coming back or developing another cancer.   Discuss these recommendations with your doctor important, so you should tell your doctor if you experience any of these symptoms. RECURRENT BREAST CANCER:    The goal of treating early and locally advanced breast cancer is to remove the cancer and keep it from coming back (breast cancer recurrence). Most people diagnosed with breast cancer will never have a breast cancer recurrence (return of breast cancer). However, everyone who has had breast cancer is at risk of recurrence. The risk of recurrence varies greatly from person to person. Talk with your healthcare provider about your risk of breast cancer recurrence and things that may lower your risk. Recurrence can be local (in the same breast or in the surgery scar), regional (in nearby lymph nodes), or in a distant area as described below:    Local Recurrence:  A local breast cancer recurrence means the cancer has come back in the same breast.  Signs and symptoms of local recurrence within the same breast may include:  -a new lump in your breast or irregular area of firmness  -a new area of the breast that seems unnaturally firm  -redness or swelling of the skin in or around the breast area   -flattening or other changes to the nipple including nipple discharge  -bumps on or under the skin of the chest wall   -new pulling of skin or swelling at the lumpectomy site   -a new thickening on or near the mastectomy scar      If you had a mastectomy and had your breast reconstructed, you may get harmless lumps caused by a build-up of scar tissue or dead fat cells in the reconstructed breast. These types of lumps aren't cancer. Still, your doctor needs to know about any lumps you feel in your breast so they can be monitored for any change in size or tenderness. Since mastectomy and reconstruction usually removes all of the breast tissue and replaces it with other tissue and/or an implant, mammogram is not usually recommended for reconstructed breasts.  Your doctor can monitor any new lumps on a reconstructed breast by performing a clinical breast exam. He or she may also recommend additional screening methods such as MRI. Regional Recurrence:  A regional breast cancer recurrence means the cancer has come back in the nearby lymph nodes. Signs and symptoms of regional recurrence may include:  -a lump or swelling in the lymph nodes under the arm, above the collarbone, near the breastbone, or in your neck   -swelling in the arm on the same side where the breast cancer was first found   -constant pain in the arm and shoulder   -loss of feeling in the arm and shoulder  -constant pain in the chest   -problems swallowing    Distant Recurrence:  A distant (metastatic) recurrence means the cancer has traveled to distant parts of the body, most commonly the bones, liver, lungs, and brain. Signs and symptoms of distant recurrence may include:  -persistent and worsening pain, such as chest or bone pain  -numbness or weakness anywhere in the body  -persistent cough  -difficulty breathing  -loss of appetite  -constant nausea  -weight loss  -severe headaches  -seizures  -vision problems  -loss of balance  -confusion    Contact your medical/radiation oncologists, surgeon, or PCP if you need assistance in these areas of concern or if you experience any new, unusual, or persistent symptoms. Community Resources   *www.[employer]/locations/specialty-locations/cancer-care-oncology/[facility]-     [place]  *www. [employer]/location/[facility]/  *http://bcefofclarkcounty.org/  *www. [employer]bongirls. org/  *Contact Siri Gomez, Psychosocial Coordinator, at [phone] for support group information     offered at [facility] and within the community. *Contact Tianna Espinoza RN, BSN, CN-BN, Breast Health Navigator, at [phone] for any     additional questions/concerns. Survivorship Patient Resources   *American Cancer Society (Martha Efrem. org/SurvivorshipCenter)  *Cancer Survivors Network (csn.cancer. org)  *https://www.Cause.it.[employer]/. org/  *Carolina Harrisonfatoumata (https://OhLife. org)  *LIVESTRONG (www.livestrong. org)  [address] (www.survivorship.cancer.gov/springboard)  [facility] [employer] (www.canceradBCD Semiconductor Holding. org)   *[address]       (www.nccn.org/patients/resources/life_after_cancer/)  *Dr. Vicki Gutierres (https://vidyanloveresearch. org)  *Rod (https://sharsheret. org)  *[facility] (NameImpressions.com.. org)       Tobacco Use:   Never smoker     Alcohol Use: Educated patient to limit alcohol intake. ECO     Pain Level: n/a     Psychological Screening:   (X) Yes ( ) No   Follow up plan:      Distress Screening completed: sadness, worry,   (X) Yes ( ) No   Comments (If applicable): Advance Directives completed: paperwork given  ( ) Yes ( ) No   Comments (if applicable):      Patient educated on follow-up appointments and expectations:   (X) Yes  ( ) No    REFERRALS:   Does this patient need a referral to PT/OT/ST or other outpatient services? (X) Yes (  ) No   Comments (If applicable): ELIANA Gould     Other referrals:  ( ) Yes (X) No   Comments (If applicable): Today, time spent with the patient discussing the intent and schedule of their treatment. The patient was given a copy of their treatment summary. Questions and concerns were addressed with the patient. Time spent with the patient face to face today was 60 minutes, counseling and coordination of care dominated more than 50% of this patient encounter.

## 2021-06-30 ENCOUNTER — CLINICAL DOCUMENTATION (OUTPATIENT)
Dept: RADIATION ONCOLOGY | Age: 66
End: 2021-06-30

## 2021-06-30 NOTE — CARE COORDINATION
Pt referred to [facility] counselor Johanna Calvin, [facility], for supportive counseling services.

## 2021-07-14 NOTE — PROGRESS NOTES
Patient Name:  Jose Hernandez  Patient :  1955  Patient MRN:  [id number]     Primary Oncologist: Angelo Vargas MD  Referring Provider: Angela Degroot     Date of Service: 2021      Chief Complaint:    Chief Complaint   Patient presents with    Follow-up      She came in for follow-up visit. Patient Active Problem List:     Hyperlipemia     HPI:   Neda Barthel is a pleasant 18-year-old female patient was referred for evaluation of left breast cancer. 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  Previous mammogram was in 2016. Diagnostic mammogram with US on 2021 showed at 1 o'clock left breast there was an irregular hypoechoic mass with associated calcifications and posterior acoustic shadowing measuring 1.4.x1.1x1.4 cm. Impression: Highly suspicious mass 1 o'clock left breast and increased calcifications upper-outer quadrant left breast.    She had US guided biopsy on 2021. Pathology report showed invasive ductal carcinoma with focal LCIS at posterior lateral tumor and 1 o'clock tumor. CBC and CMP were unremarkable. CBC and CMP in 2021 were unremarkable, except for mildly elevated alkaline phosphatase at 116. CT chest, abdomen and pelvis on 2021 was negative for metastatic disease. Bone scan in 2021 was negative. She elected to have left breast mastectomy followed by reconstruction. She has left breast mastectomy with LN biopsy on 3/8/2021. Pathology report showed : no residual cancer on mastectomy specimen. Tumor focality: 2 sites. Tumor site: Posterior lateral.  Tumor size: 4 mm. Histologic type: Invasive ductal carcinoma. Histologic grade: Grade 1  ER by IHC was more than 95%, CT by IHC was more than 95%, HER-2/mingo by IHC was 0. Tumor site: 1:00  Tumor size: 5 mm  Histologic type:  Invasive ductal carcinoma  Histologic grade: Grade 1  ER biopsy was more than 95%, CT by IHC was 50%, HER-2/mingo by FISH was not amplified. Margins were clear. No lymphovascular invasion. [place] lymph nodes were negative. Pathological stage T1a, N0, MX. She has bone density in December 2020 by Dr Tylor Nava and has osteopenia. She was recommended to take vitamin D and calcium. We went over NCCN guideline which recommends to consider adjuvant endocrine therapy. She is agreeable to arimidex. She started Arimidex on March 31, 2021. I recommend yearly mammogram and bone density every 2 years. She has 2 daughters. She is interested in genetic counseling. She has chronic osteoarthritic pain and been taking celebrex. She had positive COVID in 2020 and had COVID vaccine. She delayed her appointment to see Joann Vaz to discuss about genetic counseling due to the removal of breast implant. She will reschedule it. She had surgery 3 times to her breast prior to next office visit which may explain her anemia. She plans to go to [place] for vacation. On August 13, 2021 she came in for follow-up visit. In August 2021 WBC was 4.9, hemoglobin 10.8, platelet count 816. Ferritin was 12, TIBC 406. Folate was 9.4, B12 477.3. She has been taking oral iron supplement since August 11, 2021. Reportedly Cologuard in January 2021 was negative. She is agreeable to see GI for further evaluation. She has been having low protein and carbohydrate diet since July 2021. She also does not eat meat a lot. She takes ibuprofen 3 times a week. No acute pain. Denies any nausea, vomiting or diarrhea. No fever or chills. No chest pain, shortness of breath or palpitation. No headaches or dizzy spell. No specific bone pain. No melena or hematochezia. Denied any dysuria or hematuria.       Past Medical History:    Depression      GERD (gastroesophageal reflux disease)      Hyperlipidemia      Pap smear for cervical cancer screening 01/08/2009     Neg    Pap smear for cervical cancer screening 01/28/2010     Neg    Pap smear for cervical cancer screening 07/14/2011     Neg    Rectocele      Past Surgery History:     COLONOSCOPY   11/2008    DILATION AND CURETTAGE OF UTERUS   1985    JOINT REPLACEMENT Left 2019     partial knee    PELVIC LAPAROSCOPY   1985    TUBAL LIGATION   1991     Social History:   Tobacco Use    Smoking status: Former Smoker    Smokeless tobacco: Never Used   Substance Use Topics    Alcohol use: Yes    -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 She has her first grandchild who was born in July, 2020 as well    Family History:    Heart Failure Father      Heart Disease Father      High Cholesterol Father      Stroke Mother      Heart Failure Maternal Grandmother      Hypertension Maternal Grandmother      High Blood Pressure Maternal Grandmother      Cancer Paternal Grandmother      Diabetes Paternal Grandmother       Review of Systems: The remainder of the review of system is unremarkable. Vital Signs: BP (!) 150/71 (Site: Right Upper Arm, Position: Sitting, Cuff Size: Medium Adult)   Pulse 103   Temp 96.9 °F (36.1 °C) (Infrared)   Ht 5' 3\" (1.6 m)   Wt 176 lb 3.2 oz (79.9 kg)   SpO2 98%   BMI 31.21 kg/m²      Physical Exam:  CONSTITUTIONAL: awake, alert, cooperative, no apparent distress   EYES: pupils equal, round and reactive to light, sclera clear and conjunctiva pallor   ENT: Normocephalic, without obvious abnormality, atraumatic  NECK: supple, symmetrical, no jugular venous distension and no carotid bruits   HEMATOLOGIC/LYMPHATIC: no cervical, supraclavicular or axillary lymphadenopathy   LUNGS: no increased work of breathing and clear to auscultation   BREAST: s/p left breast mastectomy  CARDIOVASCULAR: regular rate and rhythm, normal S1 and S2, no murmur   ABDOMEN: normal bowel sound, soft, non-distended, non-tender, no masses palpated, no hepatosplenomegaly   MUSCULOSKELETAL: full range of motion noted, tone is normal  NEUROLOGIC: awake, alert, oriented to name, place and time.  Motor and sensory are grossly intact. Cranial nerves II through XII grossly intact. SKIN: Normal skin color, texture, turgor and no jaundice. appears intact   EXTREMITIES: no LE edema  or cyanosis. Labs:  Hematology:  Lab Results   Component Value Date    WBC 4.9 08/06/2021    RBC 4.05 (L) 08/06/2021    HGB 10.8 (L) 08/06/2021    HCT 33.8 (L) 08/06/2021    MCV 83.5 08/06/2021    MCH 26.7 (L) 08/06/2021    MCHC 32.0 08/06/2021    RDW 17.9 (H) 08/06/2021     08/06/2021    MPV 8.8 08/06/2021    SEGSPCT 52.4 08/06/2021    EOSRELPCT 1.6 08/06/2021    BASOPCT 0.6 08/06/2021    LYMPHOPCT 35.1 08/06/2021    MONOPCT 10.3 (H) 08/06/2021    SEGSABS 2.6 08/06/2021    EOSABS 0.1 08/06/2021    BASOSABS 0.0 08/06/2021    LYMPHSABS 1.7 08/06/2021    MONOSABS 0.5 08/06/2021    DIFFTYPE AUTOMATED DIFFERENTIAL 08/06/2021     Chemistry:  Lab Results   Component Value Date     08/06/2021    K 4.8 08/06/2021     08/06/2021    CO2 30 08/06/2021    BUN 18 08/06/2021    CREATININE 0.8 08/06/2021    GLUCOSE 116 (H) 08/06/2021    CALCIUM 9.5 08/06/2021    PROT 7.0 08/06/2021    LABALBU 4.9 08/06/2021    BILITOT 0.2 08/06/2021    ALKPHOS 107 08/06/2021    AST 19 08/06/2021    ALT 16 08/06/2021    LABGLOM >60 08/06/2021    GFRAA >60 08/06/2021     Immunology:  Lab Results   Component Value Date    PROT 7.0 08/06/2021      Observations:  No data recorded     Assessment & Plan:  1. She has two small invasive ductal carcinoma of left breast, 4 mm and 5 mm, ER/CO positive and Her-mingo negative s/p mastectomy. She started Arimidex on March 31, 2021. She has been tolerating Arimidex well. I believe she is in remission. I recommend yearly mammogram.  Due for mammogram in December 2021    2. She has osteopenia. Bone density was in December 2020. She will take vitamin D and calcium. 3. I referred to Iggy Comer for genetic counseling. 4. She has osteoarthritis and takes celebrex. 5.  She has anemia of iron deficiency.   I referred to GI for further evaluation. She started iron pill on August 11, 2021. Cologuard in January 2021 was negative. She takes ibuprofen several times a week    We discussed about healthy diet and life style. She had Covid vaccine. RTC in 12 weeks or sooner. All of her questions have been answered for today.      Electronically signed by Tiffany Kmep MD on 3/29/21 at 7:19 AM EDT

## 2021-08-06 ENCOUNTER — HOSPITAL ENCOUNTER (OUTPATIENT)
Dept: INFUSION THERAPY | Age: 66
Discharge: HOME | End: 2021-08-06
Payer: MEDICARE

## 2021-08-06 DIAGNOSIS — C50.412 MALIGNANT NEOPLASM OF UPPER-OUTER QUADRANT OF LEFT BREAST IN FEMALE, ESTROGEN RECEPTOR POSITIVE (HCC): ICD-10-CM

## 2021-08-06 DIAGNOSIS — D64.9 ANEMIA, UNSPECIFIED TYPE: ICD-10-CM

## 2021-08-06 DIAGNOSIS — Z17.0 MALIGNANT NEOPLASM OF UPPER-OUTER QUADRANT OF LEFT BREAST IN FEMALE, ESTROGEN RECEPTOR POSITIVE (HCC): ICD-10-CM

## 2021-08-06 LAB
ALBUMIN SERPL-MCNC: 4.9 GM/DL (ref 3.4–5)
ALP BLD-CCNC: 107 IU/L (ref 40–129)
ALT SERPL-CCNC: 16 U/L (ref 10–40)
ANION GAP SERPL CALCULATED.3IONS-SCNC: 7 MMOL/L (ref 4–16)
AST SERPL-CCNC: 19 IU/L (ref 15–37)
BASOPHILS ABSOLUTE: 0 K/CU MM
BASOPHILS RELATIVE PERCENT: 0.6 % (ref 0–1)
BILIRUB SERPL-MCNC: 0.2 MG/DL (ref 0–1)
BUN BLDV-MCNC: 18 MG/DL (ref 6–23)
CALCIUM SERPL-MCNC: 9.5 MG/DL (ref 8.3–10.6)
CHLORIDE BLD-SCNC: 102 MMOL/L (ref 99–110)
CO2: 30 MMOL/L (ref 21–32)
CREAT SERPL-MCNC: 0.8 MG/DL (ref 0.6–1.1)
DIFFERENTIAL TYPE: ABNORMAL
EOSINOPHILS ABSOLUTE: 0.1 K/CU MM
EOSINOPHILS RELATIVE PERCENT: 1.6 % (ref 0–3)
FERRITIN: 12 NG/ML (ref 15–150)
FOLATE: 9.4 NG/ML (ref 3.1–17.5)
GFR AFRICAN AMERICAN: >60 ML/MIN/1.73M2
GFR NON-AFRICAN AMERICAN: >60 ML/MIN/1.73M2
GLUCOSE BLD-MCNC: 116 MG/DL (ref 70–99)
HCT VFR BLD CALC: 33.8 % (ref 37–47)
HEMOGLOBIN: 10.8 GM/DL (ref 12.5–16)
IRON: 26 UG/DL (ref 37–145)
LYMPHOCYTES ABSOLUTE: 1.7 K/CU MM
LYMPHOCYTES RELATIVE PERCENT: 35.1 % (ref 24–44)
MCH RBC QN AUTO: 26.7 PG (ref 27–31)
MCHC RBC AUTO-ENTMCNC: 32 % (ref 32–36)
MCV RBC AUTO: 83.5 FL (ref 78–100)
MONOCYTES ABSOLUTE: 0.5 K/CU MM
MONOCYTES RELATIVE PERCENT: 10.3 % (ref 0–4)
PCT TRANSFERRIN: 6 % (ref 10–44)
PDW BLD-RTO: 17.9 % (ref 11.7–14.9)
PLATELET # BLD: 302 K/CU MM (ref 140–440)
PMV BLD AUTO: 8.8 FL (ref 7.5–11.1)
POTASSIUM SERPL-SCNC: 4.8 MMOL/L (ref 3.5–5.1)
RBC # BLD: 4.05 M/CU MM (ref 4.2–5.4)
SEGMENTED NEUTROPHILS ABSOLUTE COUNT: 2.6 K/CU MM
SEGMENTED NEUTROPHILS RELATIVE PERCENT: 52.4 % (ref 36–66)
SODIUM BLD-SCNC: 139 MMOL/L (ref 135–145)
TOTAL IRON BINDING CAPACITY: 406 UG/DL (ref 250–450)
TOTAL PROTEIN: 7 GM/DL (ref 6.4–8.2)
UNSATURATED IRON BINDING CAPACITY: 380 UG/DL (ref 110–370)
VITAMIN B-12: 477.3 PG/ML (ref 211–911)
WBC # BLD: 4.9 K/CU MM (ref 4–10.5)

## 2021-08-06 PROCEDURE — 82607 VITAMIN B-12: CPT

## 2021-08-06 PROCEDURE — 82746 ASSAY OF FOLIC ACID SERUM: CPT

## 2021-08-06 PROCEDURE — 36415 COLL VENOUS BLD VENIPUNCTURE: CPT

## 2021-08-06 PROCEDURE — 85025 COMPLETE CBC W/AUTO DIFF WBC: CPT

## 2021-08-06 PROCEDURE — 82728 ASSAY OF FERRITIN: CPT

## 2021-08-06 PROCEDURE — 83540 ASSAY OF IRON: CPT

## 2021-08-06 PROCEDURE — 80053 COMPREHEN METABOLIC PANEL: CPT

## 2021-08-06 PROCEDURE — 83550 IRON BINDING TEST: CPT

## 2021-08-09 ENCOUNTER — CLINICAL DOCUMENTATION (OUTPATIENT)
Dept: ONCOLOGY | Age: 66
End: 2021-08-09

## 2021-08-09 NOTE — PROGRESS NOTES
Called patient and left  to review results. Asked patient to call back tomorrow 08/10/21. Left phone number to call.

## 2021-08-10 ENCOUNTER — TELEPHONE (OUTPATIENT)
Dept: ONCOLOGY | Age: 66
End: 2021-08-10

## 2021-08-10 NOTE — TELEPHONE ENCOUNTER
Called patient to discuss lab results. Instructed to take an iron tablet daily ferrous sulfate 325 mg, and informed that Dr Tommy Mckinley has ordered an iron infusion. Patient informed to f/u with GI. Patient states that she will start taking oral iron but wants to discuss the iron infusion at her upcoming OV on 08/13/21. States that she had a cologuard recently which was negative. Informed to discuss with Dr Tommy Mckinley. Will inform Dr Tommy Mckinley of discussion.

## 2021-08-13 ENCOUNTER — HOSPITAL ENCOUNTER (OUTPATIENT)
Dept: INFUSION THERAPY | Age: 66
Discharge: HOME | End: 2021-08-13
Payer: MEDICARE

## 2021-08-13 ENCOUNTER — OFFICE VISIT (OUTPATIENT)
Dept: ONCOLOGY | Age: 66
End: 2021-08-13
Payer: MEDICARE

## 2021-08-13 VITALS
SYSTOLIC BLOOD PRESSURE: 150 MMHG | BODY MASS INDEX: 31.22 KG/M2 | OXYGEN SATURATION: 98 % | HEART RATE: 103 BPM | DIASTOLIC BLOOD PRESSURE: 71 MMHG | WEIGHT: 176.2 LBS | HEIGHT: 63 IN | TEMPERATURE: 96.9 F

## 2021-08-13 DIAGNOSIS — Z86.2 HISTORY OF IRON DEFICIENCY ANEMIA: Primary | ICD-10-CM

## 2021-08-13 PROCEDURE — 4040F PNEUMOC VAC/ADMIN/RCVD: CPT | Performed by: INTERNAL MEDICINE

## 2021-08-13 PROCEDURE — G8417 CALC BMI ABV UP PARAM F/U: HCPCS | Performed by: INTERNAL MEDICINE

## 2021-08-13 PROCEDURE — G8400 PT W/DXA NO RESULTS DOC: HCPCS | Performed by: INTERNAL MEDICINE

## 2021-08-13 PROCEDURE — 99214 OFFICE O/P EST MOD 30 MIN: CPT | Performed by: INTERNAL MEDICINE

## 2021-08-13 PROCEDURE — 1036F TOBACCO NON-USER: CPT | Performed by: INTERNAL MEDICINE

## 2021-08-13 PROCEDURE — G8427 DOCREV CUR MEDS BY ELIG CLIN: HCPCS | Performed by: INTERNAL MEDICINE

## 2021-08-13 PROCEDURE — 1123F ACP DISCUSS/DSCN MKR DOCD: CPT | Performed by: INTERNAL MEDICINE

## 2021-08-13 PROCEDURE — 1090F PRES/ABSN URINE INCON ASSESS: CPT | Performed by: INTERNAL MEDICINE

## 2021-08-13 PROCEDURE — 99211 OFF/OP EST MAY X REQ PHY/QHP: CPT

## 2021-08-13 PROCEDURE — 3017F COLORECTAL CA SCREEN DOC REV: CPT | Performed by: INTERNAL MEDICINE

## 2021-08-13 RX ORDER — FERROUS SULFATE 325(65) MG
325 TABLET ORAL
COMMUNITY

## 2021-08-13 NOTE — PROGRESS NOTES
[place] Rooming Questions  Patient: Debra Cohen  MRN: [id number]    Date: 8/13/2021        1. Do you have any new issues?   no         2. Do you need any refills on medications?    no    3. Have you had any imaging done since your last visit?   no    4. Have you been hospitalized or seen in the emergency room since your last visit here?   no    5. Did the patient have a depression screening completed today?  No    No data recorded     PHQ-9 Given to (if applicable):               PHQ-9 Score (if applicable):                     [] Positive     []  Negative              Does question #9 need addressed (if applicable)                     [] Yes    []  No               Caterina Viera CMA

## 2021-08-16 ENCOUNTER — TELEPHONE (OUTPATIENT)
Dept: GASTROENTEROLOGY | Age: 66
End: 2021-08-16

## 2021-09-08 ENCOUNTER — OFFICE VISIT (OUTPATIENT)
Dept: GASTROENTEROLOGY | Age: 66
End: 2021-09-08
Payer: MEDICARE

## 2021-09-08 VITALS
BODY MASS INDEX: 30.62 KG/M2 | OXYGEN SATURATION: 92 % | DIASTOLIC BLOOD PRESSURE: 82 MMHG | SYSTOLIC BLOOD PRESSURE: 134 MMHG | TEMPERATURE: 97.2 F | HEIGHT: 63 IN | WEIGHT: 172.8 LBS | HEART RATE: 99 BPM

## 2021-09-08 DIAGNOSIS — K21.9 GASTROESOPHAGEAL REFLUX DISEASE, UNSPECIFIED WHETHER ESOPHAGITIS PRESENT: ICD-10-CM

## 2021-09-08 DIAGNOSIS — Z01.818 PREOP TESTING: ICD-10-CM

## 2021-09-08 DIAGNOSIS — Z86.010 HISTORY OF COLON POLYPS: ICD-10-CM

## 2021-09-08 DIAGNOSIS — D64.9 ANEMIA, UNSPECIFIED TYPE: Primary | ICD-10-CM

## 2021-09-08 PROCEDURE — 4040F PNEUMOC VAC/ADMIN/RCVD: CPT | Performed by: NURSE PRACTITIONER

## 2021-09-08 PROCEDURE — G8417 CALC BMI ABV UP PARAM F/U: HCPCS | Performed by: NURSE PRACTITIONER

## 2021-09-08 PROCEDURE — G8427 DOCREV CUR MEDS BY ELIG CLIN: HCPCS | Performed by: NURSE PRACTITIONER

## 2021-09-08 PROCEDURE — 1123F ACP DISCUSS/DSCN MKR DOCD: CPT | Performed by: NURSE PRACTITIONER

## 2021-09-08 PROCEDURE — 1036F TOBACCO NON-USER: CPT | Performed by: NURSE PRACTITIONER

## 2021-09-08 PROCEDURE — 99214 OFFICE O/P EST MOD 30 MIN: CPT | Performed by: NURSE PRACTITIONER

## 2021-09-08 PROCEDURE — 3017F COLORECTAL CA SCREEN DOC REV: CPT | Performed by: NURSE PRACTITIONER

## 2021-09-08 PROCEDURE — G8400 PT W/DXA NO RESULTS DOC: HCPCS | Performed by: NURSE PRACTITIONER

## 2021-09-08 PROCEDURE — 1090F PRES/ABSN URINE INCON ASSESS: CPT | Performed by: NURSE PRACTITIONER

## 2021-09-08 RX ORDER — SIMETHICONE 80 MG
80 TABLET,CHEWABLE ORAL ONCE
Qty: 3 TABLET | Refills: 0 | Status: SHIPPED | OUTPATIENT
Start: 2021-09-08 | End: 2022-03-23

## 2021-09-08 RX ORDER — POLYETHYLENE GLYCOL 3350, SODIUM SULFATE, SODIUM CHLORIDE, POTASSIUM CHLORIDE, ASCORBIC ACID, SODIUM ASCORBATE 140-9-5.2G
1 KIT ORAL ONCE
Qty: 1 EACH | Refills: 0 | Status: SHIPPED | OUTPATIENT
Start: 2021-09-08 | End: 2021-09-08

## 2021-09-08 ASSESSMENT — ENCOUNTER SYMPTOMS
PHOTOPHOBIA: 0
DIARRHEA: 0
NAUSEA: 0
COUGH: 0
SHORTNESS OF BREATH: 0
CONSTIPATION: 0
BACK PAIN: 0
BLOOD IN STOOL: 0
VOMITING: 0
WHEEZING: 0
COLOR CHANGE: 0
EYE PAIN: 0
ABDOMINAL PAIN: 0

## 2021-09-08 NOTE — PATIENT INSTRUCTIONS
Patient Education        Upper GI Endoscopy: Before Your Procedure  What is an upper GI endoscopy? An upper gastrointestinal (or GI) endoscopy is a test that allows your doctor to look at the inside of your esophagus, stomach, and the first part of your small intestine, called the duodenum. The esophagus is the tube that carries food to your stomach. The doctor uses a thin, lighted tube that bends. It is called an endoscope, or scope. The doctor puts the tip of the scope in your mouth and gently moves it down your throat. The scope is a flexible video camera. The doctor looks at a monitor (like a TV set or a computer screen) as he or she moves the scope. A doctor may do this procedure to look for ulcers, tumors, infection, or bleeding. It also can be used to look for signs of acid backing up into your esophagus. This is called gastroesophageal reflux disease, or GERD. The doctor can use the scope to take a sample of tissue for study (a biopsy). The doctor also can use the scope to take out growths or stop bleeding. Follow-up care is a key part of your treatment and safety. Be sure to make and go to all appointments, and call your doctor if you are having problems. It's also a good idea to know your test results and keep a list of the medicines you take. How do you prepare for the procedure? Procedures can be stressful. This information will help you understand what you can expect. And it will help you safely prepare for your procedure. Preparing for the procedure    · Do not eat or drink anything for 6 to 8 hours before the test. An empty stomach helps your doctor see your stomach clearly during the test. It also reduces your chances of vomiting. If you vomit, there is a small risk that the vomit could enter your lungs.  (This is called aspiration.) If the test is done in an emergency, a tube may be inserted through your nose or mouth to empty your stomach.     · Do not take sucralfate (Carafate) or antacids on the day of the test. These medicines can make it hard for your doctor to see your upper GI tract.     · If your doctor tells you to, stop taking iron supplements 7 to 14 days before the test.     · Be sure you have someone to take you home. Anesthesia and pain medicine will make it unsafe for you to drive or get home on your own.     · Understand exactly what procedure is planned, along with the risks, benefits, and other options. · Tell your doctor ALL the medicines, vitamins, supplements, and herbal remedies you take. Some may increase the risk of problems during your procedure. Your doctor will tell you if you should stop taking any of them before the procedure and how soon to do it.     · If you take aspirin or some other blood thinner, ask your doctor if you should stop taking it before your procedure. Make sure that you understand exactly what your doctor wants you to do. These medicines increase the risk of bleeding.     · Make sure your doctor and the hospital have a copy of your advance directive. If you don't have one, you may want to prepare one. It lets others know your health care wishes. It's a good thing to have before any type of surgery or procedure. What happens on the day of the procedure? · Follow the instructions exactly about when to stop eating and drinking. If you don't, your procedure may be canceled. If your doctor told you to take your medicines on the day of the procedure, take them with only a sip of water.     · Take a bath or shower before you come in for your procedure. Do not apply lotions, perfumes, deodorants, or nail polish.     · Take off all jewelry and piercings. And take out contact lenses, if you wear them. At the hospital or surgery center   · Bring a picture ID.     · The test may take 15 to 30 minutes.     · The doctor may spray medicine on the back of your throat to numb it.  You also will get medicine to prevent pain and to relax you.     · You will lie on your left side. The doctor will put the scope in your mouth and toward the back of your throat. The doctor will tell you when to swallow. This helps the scope move down your throat. You will be able to breathe normally. The doctor will move the scope down your esophagus into your stomach. The doctor also may look at the duodenum.     · If your doctor wants to take a sample of tissue for a biopsy, he or she may use small surgical tools, which are put into the scope, to cut off some tissue. You will not feel a biopsy, if one is taken. The doctor also can use the tools to stop bleeding or to do other treatments, if needed.     · You will stay at the hospital or surgery center for 1 to 2 hours until the medicine you were given wears off. What happens after an upper GI endoscopy? · After the test, you may belch and feel bloated for a while.     · You may have a tickling, dry throat or mouth. You may feel a bit hoarse, and you may have a mild sore throat. These symptoms may last several days. Throat lozenges and warm saltwater gargles can help relieve the throat symptoms.     · Don't drive or operate machinery for 12 hours after the test.     · Your doctor will tell you when you can go back to your usual diet and activities.     · Don't drink alcohol for 12 to 24 hours after the test.   When should you call your doctor? · You have questions or concerns.     · You don't understand how to prepare for your procedure.     · You become ill before the procedure (such as fever, flu, or a cold).     · You need to reschedule or have changed your mind about having the procedure. Where can you learn more? Go to [email or web address]://[email or web address].[email or web address] org and sign in to your [email or web address].[email or web address] account. Enter P790 in the [employer] box to learn more about \"Upper GI Endoscopy: Before Your Procedure. \"     If you do not have an account, please click on the \"Sign Up Now\" link.   Current as of: February 10, 2021               Content Version: 12.9  © 2006-2021 Healthwise, [employer]. Care instructions adapted under license by [facility] [place] ([facility]). If you have questions about a medical condition or this instruction, always ask your healthcare professional. [address] any warranty or liability for your use of this information. Patient Education        Colonoscopy: Before Your Procedure  What is a colonoscopy? A colonoscopy is a test that lets a doctor look inside your colon. The doctor uses a thin, lighted tube called a colonoscope to look for problems. These include small growths called polyps, cancer, or bleeding. During the test, the doctor can take samples of tissue that can be checked for cancer or other problems. This is called a biopsy. The doctor can also take out polyps. Before the test, you will need to stop eating solid foods. You also will be given instructions on how to clean out your colon. This helps your doctor be able to see inside your colon during the test.  How do you prepare for the procedure? Procedures can be stressful. This information will help you understand what you can expect. And it will help you safely prepare for your procedure. Preparing for the procedure    · Be sure you have someone to take you home. Anesthesia and pain medicine will make it unsafe for you to drive or get home on your own. · Understand exactly what procedure is planned, along with the risks, benefits, and other options.     · Tell your doctor ALL the medicines, vitamins, supplements, and herbal remedies you take. Some may increase the risk of problems during your procedure. Your doctor will tell you if you should stop taking any of them before the procedure and how soon to do it.     · If you take aspirin or some other blood thinner, ask your doctor if you should stop taking it before your procedure. Make sure that you understand exactly what your doctor wants you to do.  These medicines increase the risk of bleeding.     · Make sure your doctor and the hospital have a copy of your advance directive. If you don't have one, you may want to prepare one. It lets others know your health care wishes. It's a good thing to have before any type of surgery or procedure. Before the procedure    · Follow your doctor's directions about when to stop eating solid foods and drink only clear liquids. You can drink water, clear juices, clear broths, flavored ice pops, and gelatin (such as Jell-O). Do not eat or drink anything red or purple. This includes grape juice and grape-flavored ice pops. It also includes fruit punch and cherry gelatin.     · Drink the \"colon prep\" liquid as your doctor tells you. You will want to stay home, because the liquid will make you go to the bathroom a lot. Your stools will be loose and watery. It's very important to drink all of the liquid. If you have problems drinking it, call your doctor.     · Do not eat any solid foods after you drink the colon prep.     · Stop drinking clear liquids for a few hours before the test. Your doctor will tell you how many hours this will be. What happens on the day of the procedure? · Follow the instructions exactly about when to stop eating and drinking. If you don't, your procedure may be canceled. If your doctor told you to take your medicines on the day of the procedure, take them with only a sip of water.     · Take a bath or shower before you come in for your procedure. Do not apply lotions, perfumes, deodorants, or nail polish.     · Take off all jewelry and piercings. And take out contact lenses, if you wear them. At the doctor's office or hospital   · Bring a picture ID.     · You will be kept comfortable and safe by your anesthesia provider. The anesthesia may make you sleep.     · You will lie on your back or your side with your knees drawn up toward your belly. The doctor will gently put a gloved finger into your anus. Then the doctor puts the scope in and moves it into your colon. The scope goes in easily because it is lubricated.     · The doctor may also use small tools to take tissue samples for a biopsy or to remove polyps. This does not hurt.     · The test usually takes 30 to 45 minutes. But it may take longer. It depends on what is found and what is done. When should you call your doctor? · You have questions or concerns.     · You don't understand how to prepare for your procedure.     · You are having trouble with the bowel prep.     · You become ill before the procedure (such as fever, flu, or a cold).     · You need to reschedule or have changed your mind about having the procedure. Where can you learn more? Go to https://GraphScience.Panda [email or web address] org and sign in to your CosmosID account. Enter C315 in the [employer] box to learn more about \"Colonoscopy: Before Your Procedure. \"     If you do not have an account, please click on the \"Sign Up Now\" link. Current as of: December 17, 2020               Content Version: 12.9  © 6127-2374 Healthwise, Incorporated. Care instructions adapted under license by [facility] ([facility] [place] [facility]). If you have questions about a medical condition or this instruction, always ask your healthcare professional. [address]  any warranty or liability for your use of this information.

## 2021-09-08 NOTE — PROGRESS NOTES
melena. Her paternal grandmother had colon cancer at uncertain age. No family history of stomach cancer. ROS  Review of Systems   Constitutional: Positive for fatigue. Negative for appetite change, chills, diaphoresis, fever and unexpected weight change. HENT: Negative for ear pain, hearing loss and tinnitus. Eyes: Negative for photophobia and pain. Respiratory: Negative for cough, shortness of breath and wheezing. Cardiovascular: Negative for chest pain, palpitations and leg swelling. Gastrointestinal: Negative for abdominal pain, blood in stool, constipation, diarrhea, nausea and vomiting. Endocrine: Negative for cold intolerance, heat intolerance and polydipsia. Genitourinary: Negative for dysuria, frequency and urgency. Musculoskeletal: Negative for back pain, myalgias and neck pain. Skin: Negative for color change, pallor and rash. Allergic/Immunologic: Negative for environmental allergies and food allergies. Neurological: Negative for dizziness, seizures, weakness and headaches. Hematological: Does not bruise/bleed easily. Psychiatric/Behavioral: Positive for dysphoric mood and sleep disturbance. Negative for suicidal ideas. The patient is not nervous/anxious. Allergies  No Known Allergies    Medications  Current Outpatient Medications   Medication Sig Dispense Refill    ferrous sulfate (IRON 325) 325 (65 Fe) MG tablet Take 325 mg by mouth daily (with breakfast)      anastrozole (ARIMIDEX) 1 MG tablet Take 1 tablet by mouth daily 90 tablet 1    Mastectomy Bra MISC by Does not apply route 4 each 0    Breast Prosthesis MISC by Does not apply route 1 each 0    omeprazole (PRILOSEC) 40 MG delayed release capsule TAKE 1 CAPSULE BY MOUTH DAILY      gabapentin (NEURONTIN) 300 MG capsule Take 300 mg by mouth 3 times daily.  atorvastatin (LIPITOR) 40 MG tablet       FLUoxetine (PROZAC) 20 MG capsule        No current facility-administered medications for this visit. Past medical history:   She has a past medical history of Arthritis, Depression, GERD (gastroesophageal reflux disease), Hyperlipidemia, Pap smear for cervical cancer screening, Pap smear for cervical cancer screening, Pap smear for cervical cancer screening, and Rectocele. Past surgical history:  She has a past surgical history that includes pelvic laparoscopy (1985); Dilation and curettage of uterus (1985); Colonoscopy (11/2008); Tubal ligation (1991); joint replacement (Left, 2019); Breast biopsy; knee surgery; and Mastectomy, partial (Left, 03/08/2021). Social History:  She reports that she has never smoked. She has never used smokeless tobacco. She reports current alcohol use. She reports that she does not use drugs. Family history:  Her family history includes Arthritis in her father and paternal aunt; Cancer in her paternal grandmother; Coronary Art Dis in her father; Diabetes in her paternal grandmother; Heart Disease in her father; Heart Failure in her father and maternal grandmother; High Blood Pressure in her maternal grandmother; High Cholesterol in her father; Hypertension in her maternal grandmother; Stroke in her mother. Objective    Vitals:    09/08/21 1335   BP: 134/82   Pulse: 99   Temp: 97.2 °F (36.2 °C)   SpO2: 92%        Physical exam    Physical Exam  Vitals reviewed. Constitutional:       General: She is not in acute distress. Appearance: She is well-developed. She is obese. She is not ill-appearing, toxic-appearing or diaphoretic. HENT:      Head: Normocephalic and atraumatic. Nose: Nose normal.      Mouth/Throat:      Mouth: Mucous membranes are moist.   Eyes:      Conjunctiva/sclera: Conjunctivae normal.      Pupils: Pupils are equal, round, and reactive to light. Neck:      Thyroid: No thyromegaly. Vascular: No JVD. Trachea: No tracheal deviation. Cardiovascular:      Rate and Rhythm: Normal rate and regular rhythm. Pulses: Normal pulses. Heart sounds: Normal heart sounds. No murmur heard. No friction rub. No gallop. Pulmonary:      Effort: Pulmonary effort is normal. No respiratory distress. Breath sounds: Normal breath sounds. No stridor. No wheezing, rhonchi or rales. Chest:      Chest wall: No tenderness. Abdominal:      General: Bowel sounds are normal. There is no distension. Palpations: Abdomen is soft. There is no mass. Tenderness: There is no abdominal tenderness. There is no guarding or rebound. Hernia: No hernia is present. Musculoskeletal:         General: Normal range of motion. Cervical back: Normal range of motion and neck supple. Lymphadenopathy:      Cervical: No cervical adenopathy. Skin:     General: Skin is warm and dry. Neurological:      Mental Status: She is alert and oriented to person, place, and time.    Psychiatric:         Mood and Affect: Mood normal.         Hospital Outpatient Visit on 08/06/2021   Component Date Value Ref Range Status    WBC 08/06/2021 4.9  4.0 - 10.5 K/CU MM Final    RBC 08/06/2021 4.05* 4.2 - 5.4 M/CU MM Final    Hemoglobin 08/06/2021 10.8* 12.5 - 16.0 GM/DL Final    Hematocrit 08/06/2021 33.8* 37 - 47 % Final    MCV 08/06/2021 83.5  78 - 100 FL Final    MCH 08/06/2021 26.7* 27 - 31 PG Final    MCHC 08/06/2021 32.0  32.0 - 36.0 % Final    RDW 08/06/2021 17.9* 11.7 - 14.9 % Final    Platelets 56/30/8626 302  140 - 440 K/CU MM Final    MPV 08/06/2021 8.8  7.5 - 11.1 FL Final    Differential Type 08/06/2021 AUTOMATED DIFFERENTIAL   Final    Segs Relative 08/06/2021 52.4  36 - 66 % Final    Lymphocytes % 08/06/2021 35.1  24 - 44 % Final    Monocytes % 08/06/2021 10.3* 0 - 4 % Final    Eosinophils % 08/06/2021 1.6  0 - 3 % Final    Basophils % 08/06/2021 0.6  0 - 1 % Final    Segs Absolute 08/06/2021 2.6  K/CU MM Final    Lymphocytes Absolute 08/06/2021 1.7  K/CU MM Final    Monocytes Absolute 08/06/2021 0.5  K/CU MM Final    Eosinophils Absolute 08/06/2021 0.1  K/CU MM Final    Basophils Absolute 08/06/2021 0.0  K/CU MM Final    Sodium 08/06/2021 139  135 - 145 MMOL/L Final    Potassium 08/06/2021 4.8  3.5 - 5.1 MMOL/L Final    Chloride 08/06/2021 102  99 - 110 mMol/L Final    CO2 08/06/2021 30  21 - 32 MMOL/L Final    BUN 08/06/2021 18  6 - 23 MG/DL Final    CREATININE 08/06/2021 0.8  0.6 - 1.1 MG/DL Final    Glucose 08/06/2021 116* 70 - 99 MG/DL Final    Calcium 08/06/2021 9.5  8.3 - 10.6 MG/DL Final    Albumin 08/06/2021 4.9  3.4 - 5.0 GM/DL Final    Total Protein 08/06/2021 7.0  6.4 - 8.2 GM/DL Final    Total Bilirubin 08/06/2021 0.2  0.0 - 1.0 MG/DL Final    ALT 08/06/2021 16  10 - 40 U/L Final    AST 08/06/2021 19  15 - 37 IU/L Final    Alkaline Phosphatase 08/06/2021 107  40 - 129 IU/L Final    GFR Non- 08/06/2021 >60  >60 mL/min/1.73m2 Final    GFR  08/06/2021 >60  >60 mL/min/1.73m2 Final    Anion Gap 08/06/2021 7  4 - 16 Final    Ferritin 08/06/2021 12* 15 - 150 NG/ML Final    Iron 08/06/2021 26* 37 - 145 ug/dL Final    UIBC 08/06/2021 380* 110 - 370 ug/dL Final    TIBC 08/06/2021 406  250 - 450 ug/dL Final    Transferrin % 08/06/2021 6* 10 - 44 % Final    Vitamin B-12 08/06/2021 477.3  211 - 911 pg/ml Final    Folate 08/06/2021 9.4  3.1 - 17.5 NG/ML Final       Assessment and Plan:  1. Will plan for an EGD/colonoscopy with MAC anesthesia. The patient was informed of the risks and benefits of the procedures. 2.  Normocytic anemia most likely due to iron deficiency; currently the patient did not have signs of GI bleeding. The patient has mild fatigue but no pallor or shortness of breath at this time. The patient was encouraged to continue taking Iron supplements. Recommend periodic monitoring of H&H. Will plan for EGD/colonoscopy to rule out GI source for bleeding. 3.  GERD that is long term without dysphagia or odynophagia.   The patient was encouraged to continue taking Prilosec daily for treatment. The patient was instructed to continue with anti-reflux measures and avoid foods that worsen. 4.  History of colon polyps will order colonoscopy for colorectal cancer surveillance. 5.  Further recommendations for follow-up will be determined after the EGD/colonoscopy have been completed.

## 2021-09-21 NOTE — PROGRESS NOTES
Surgery  09/29/21 @ 0800 arrive @ 0700               1. Do not eat or drink anything after midnight - unless instructed by your doctor prior to surgery. This includes                   no water, chewing gum or mints. 2. Follow your directions as prescribed by the doctor for your procedure and medications. Take Omeprazole morning of with a sip. 3. Check with your Doctor regarding stopping Plavix, Coumadin, Lovenox,Effient,Pradaxa,Xarelto, Fragmin or other blood thinners and                   follow their instructions. 4. Do not smoke, and do not drink any alcoholic beverages 24 hours prior to surgery. This includes NA Beer. 5. You may brush your teeth and gargle the morning of surgery. DO NOT SWALLOW WATER   6. You MUST make arrangements for a responsible adult to take you home after your surgery and be able to check on you every couple                   hours for the day. You will not be allowed to leave alone or drive yourself home. It is strongly suggested someone stay with you the first 24                   hrs. Your surgery will be cancelled if you do not have a ride home. 7. Please wear simple, loose fitting clothing to the hospital.  [employer] not bring valuables (money, credit cards, checkbooks, etc.) Do not wear any                   makeup (including no eye makeup) or nail polish on your fingers or toes. 8. DO NOT wear any jewelry or piercings on day of surgery. All body piercing jewelry must be removed. 9. If you have dentures, they will be removed before going to the OR; we will provide you a container. If you wear contact lenses or glasses,                  they will be removed; please bring a case for them. 10. If you  have a Living Will and Durable Power of  for Healthcare, please bring in a copy. 11. Please bring picture ID,  insurance card, paperwork from the doctors office    (H & P, Consent, & card for implantable devices).            12. Take a shower the night before or morning of your procedure, do not apply any lotion, oil or powder. 13. Wear a mask covering your nose & mouth when entering the hospital. Have your covid-19 test performed within 2-7 days of your                  Surgery. Covid test scheduled for 09/22/21. Quarantine yourself after the test until after your surgery.

## 2021-09-23 LAB — SARS-COV-2: NOT DETECTED

## 2021-09-27 ENCOUNTER — ANESTHESIA EVENT (OUTPATIENT)
Dept: OPERATING ROOM | Age: 66
End: 2021-09-27
Payer: MEDICARE

## 2021-09-27 NOTE — H&P
Original H &P in soft chart. I have examined the patient immediately before the procedure and there is no change in the previous history and physical exam, which has been reviewed. There is no history of sleep apnea, snoring, or stridor. There has been no  previous adverse experience with sedation/anesthesia. There is no increased risk for aspiration of gastric contents. The patient has been instructed that all resuscitative measures (during the operative and immediate perioperative period) will be instituted in the unlikely event that they will be needed. The patient has no pertinent past surgical or family history other than listed in the original H&P. The patient was counseled about the risks of garrett Covid-19 during their perioperative period and any recovery window from their procedure. The patient was made aware that garrett Covid-19  may worsen their prognosis for recovering from their procedure  and lend to a higher morbidity and/or mortality risk. All material risks, benefits, and reasonable alternatives including postponing the procedure were discussed. The patient does wish to proceed with the procedure at this time.     ASA Class: 2  AIRWAY Class: 1

## 2021-09-27 NOTE — ANESTHESIA PRE PROCEDURE
Department of Anesthesiology  Preprocedure Note       Name:  Debra Cohen   Age:  73 y.o.  :  1955                                          MRN:  [id number]         Date:  2021      Surgeon: Samanta Reid):  Brigid Sparks MD    Procedure: Procedure(s):  COLONOSCOPY DIAGNOSTIC  EGD ESOPHAGOGASTRODUODENOSCOPY    Medications prior to admission:   Prior to Admission medications    Medication Sig Start Date End Date Taking? Authorizing Provider   anastrozole (ARIMIDEX) 1 MG tablet Take 1 tablet by mouth daily 21  Yes Rodrigo Pitt MD   omeprazole (PRILOSEC) 40 MG delayed release capsule TAKE 1 CAPSULE BY MOUTH DAILY 3/15/21  Yes Historical Provider, MD   gabapentin (NEURONTIN) 300 MG capsule Take 300 mg by mouth nightly. Yes Historical Provider, MD   atorvastatin (LIPITOR) 40 MG tablet  10/28/13  Yes Historical Provider, MD   FLUoxetine (PROZAC) 20 MG capsule  13  Yes Historical Provider, MD   simethicone (MYLICON) 80 MG chewable tablet Take 1 tablet by mouth once for 1 dose 21  Darya Darnell, APRN - CNP   ferrous sulfate (IRON 325) 325 (65 Fe) MG tablet Take 325 mg by mouth daily (with breakfast)    Historical Provider, MD   Mastectomy Bra MISC by Does not apply route 21   Sam Cano MD   Breast Prosthesis MISC by Does not apply route 21   Sam Cano MD       Current medications:    No current facility-administered medications for this encounter. Current Outpatient Medications   Medication Sig Dispense Refill    anastrozole (ARIMIDEX) 1 MG tablet Take 1 tablet by mouth daily 90 tablet 1    omeprazole (PRILOSEC) 40 MG delayed release capsule TAKE 1 CAPSULE BY MOUTH DAILY      gabapentin (NEURONTIN) 300 MG capsule Take 300 mg by mouth nightly.        atorvastatin (LIPITOR) 40 MG tablet       FLUoxetine (PROZAC) 20 MG capsule       simethicone (MYLICON) 80 MG chewable tablet Take 1 tablet by mouth once for 1 dose 3 tablet 0    ferrous sulfate (IRON 325) 325 (65 Fe) MG tablet Take 325 mg by mouth daily (with breakfast)      Mastectomy Bra MISC by Does not apply route 4 each 0    Breast Prosthesis MISC by Does not apply route 1 each 0       Allergies:  No Known Allergies    Problem List:    Patient Active Problem List   Diagnosis Code    Hyperlipemia E78.5    Malignant neoplasm of upper-outer quadrant of left breast in female, estrogen receptor positive ([facility] C50.412, Z17.0       Past Medical History:        Diagnosis Date    Arthritis     Cancer ([facility] 01/2021    L breast     Depression     GERD (gastroesophageal reflux disease)     History of blood transfusion     Hyperlipidemia     Pap smear for cervical cancer screening 01/08/2009    Neg    Pap smear for cervical cancer screening 01/28/2010    Neg    Pap smear for cervical cancer screening 07/14/2011    Neg    Rectocele        Past Surgical History:        Procedure Laterality Date    BREAST BIOPSY      COLONOSCOPY  11/2008    DILATION AND CURETTAGE OF UTERUS  1985    JOINT REPLACEMENT Left 2019    partial knee    KNEE SURGERY      MASTECTOMY, PARTIAL Left 03/08/2021    PELVIC LAPAROSCOPY  1985    TUBAL LIGATION  1991       Social History:    Social History     Tobacco Use    Smoking status: Never Smoker    Smokeless tobacco: Never Used   Substance Use Topics    Alcohol use: Yes                                Counseling given: Not Answered      Vital Signs (Current):   Vitals:    09/21/21 1319   Weight: 169 lb (76.7 kg)   Height: 5' 2\" (1.575 m)                                              BP Readings from Last 3 Encounters:   09/08/21 134/82   08/13/21 (!) 150/71   06/28/21 (!) 134/91       NPO Status:                                                                                 BMI:   Wt Readings from Last 3 Encounters:   09/22/21 173 lb (78.5 kg)   09/08/21 172 lb 12.8 oz (78.4 kg)   08/13/21 176 lb 3.2 oz (79.9 kg)     Body mass index is 30.91 kg/m².     CBC:   Lab Results Component Value Date    WBC 4.9 08/06/2021    RBC 4.05 08/06/2021    HGB 10.8 08/06/2021    HCT 33.8 08/06/2021    MCV 83.5 08/06/2021    RDW 17.9 08/06/2021     08/06/2021       CMP:   Lab Results   Component Value Date     08/06/2021    K 4.8 08/06/2021     08/06/2021    CO2 30 08/06/2021    BUN 18 08/06/2021    CREATININE 0.8 08/06/2021    GFRAA >60 08/06/2021    LABGLOM >60 08/06/2021    GLUCOSE 116 08/06/2021    PROT 7.0 08/06/2021    CALCIUM 9.5 08/06/2021    BILITOT 0.2 08/06/2021    ALKPHOS 107 08/06/2021    AST 19 08/06/2021    ALT 16 08/06/2021       POC Tests: No results for input(s): POCGLU, POCNA, POCK, POCCL, POCBUN, POCHEMO, POCHCT in the last 72 hours. Coags: No results found for: PROTIME, INR, APTT    HCG (If Applicable): No results found for: PREGTESTUR, PREGSERUM, HCG, HCGQUANT     ABGs: No results found for: PHART, PO2ART, IBJ7OGQ, UNF2ZZH, BEART, M7JLQSGR     Type & Screen (If Applicable):  No results found for: LABABO, LABRH    Drug/Infectious Status (If Applicable):  No results found for: HIV, HEPCAB    COVID-19 Screening (If Applicable):   Lab Results   Component Value Date    COVID19 Not Detected 09/22/2021           Anesthesia Evaluation    Airway:         Dental:          Pulmonary:                              Cardiovascular:    (+) hyperlipidemia                  Neuro/Psych:   (+) depression/anxiety             GI/Hepatic/Renal:   (+) GERD:, bowel prep,           Endo/Other:    (+) malignancy/cancer. Abdominal:             Vascular: Other Findings:           Anesthesia Plan      MAC     ASA 2     (Chart review)  Induction: intravenous.                           TARA Alfonso - CRNA   9/27/2021

## 2021-09-29 ENCOUNTER — HOSPITAL ENCOUNTER (OUTPATIENT)
Age: 66
Setting detail: OUTPATIENT SURGERY
Discharge: HOME | End: 2021-09-29
Attending: SPECIALIST | Admitting: SPECIALIST
Payer: MEDICARE

## 2021-09-29 ENCOUNTER — ANESTHESIA (OUTPATIENT)
Dept: OPERATING ROOM | Age: 66
End: 2021-09-29
Payer: MEDICARE

## 2021-09-29 VITALS
HEART RATE: 71 BPM | RESPIRATION RATE: 15 BRPM | DIASTOLIC BLOOD PRESSURE: 81 MMHG | OXYGEN SATURATION: 98 % | WEIGHT: 166 LBS | HEIGHT: 62 IN | TEMPERATURE: 96.8 F | BODY MASS INDEX: 30.55 KG/M2 | SYSTOLIC BLOOD PRESSURE: 121 MMHG

## 2021-09-29 VITALS — OXYGEN SATURATION: 100 % | DIASTOLIC BLOOD PRESSURE: 89 MMHG | SYSTOLIC BLOOD PRESSURE: 131 MMHG

## 2021-09-29 DIAGNOSIS — K21.9 GASTROESOPHAGEAL REFLUX DISEASE WITHOUT ESOPHAGITIS: ICD-10-CM

## 2021-09-29 DIAGNOSIS — D64.9 ANEMIA, UNSPECIFIED TYPE: ICD-10-CM

## 2021-09-29 PROCEDURE — 2709999900 HC NON-CHARGEABLE SUPPLY: Performed by: SPECIALIST

## 2021-09-29 PROCEDURE — 6360000002 HC RX W HCPCS: Performed by: NURSE ANESTHETIST, CERTIFIED REGISTERED

## 2021-09-29 PROCEDURE — 45388 COLONOSCOPY W/ABLATION: CPT | Performed by: SPECIALIST

## 2021-09-29 PROCEDURE — 87077 CULTURE AEROBIC IDENTIFY: CPT

## 2021-09-29 PROCEDURE — 7100000010 HC PHASE II RECOVERY - FIRST 15 MIN: Performed by: SPECIALIST

## 2021-09-29 PROCEDURE — 3609009900 HC COLONOSCOPY W/CONTROL BLEEDING ANY METHOD: Performed by: SPECIALIST

## 2021-09-29 PROCEDURE — 2720000010 HC SURG SUPPLY STERILE: Performed by: SPECIALIST

## 2021-09-29 PROCEDURE — 3609012400 HC EGD TRANSORAL BIOPSY SINGLE/MULTIPLE: Performed by: SPECIALIST

## 2021-09-29 PROCEDURE — 7100000011 HC PHASE II RECOVERY - ADDTL 15 MIN: Performed by: SPECIALIST

## 2021-09-29 PROCEDURE — 2580000003 HC RX 258: Performed by: NURSE PRACTITIONER

## 2021-09-29 PROCEDURE — 43239 EGD BIOPSY SINGLE/MULTIPLE: CPT | Performed by: SPECIALIST

## 2021-09-29 PROCEDURE — 3700000000 HC ANESTHESIA ATTENDED CARE: Performed by: SPECIALIST

## 2021-09-29 PROCEDURE — 3700000001 HC ADD 15 MINUTES (ANESTHESIA): Performed by: SPECIALIST

## 2021-09-29 RX ORDER — LIDOCAINE HYDROCHLORIDE 20 MG/ML
INJECTION, SOLUTION INTRAVENOUS PRN
Status: DISCONTINUED | OUTPATIENT
Start: 2021-09-29 | End: 2021-09-29 | Stop reason: SDUPTHER

## 2021-09-29 RX ORDER — SODIUM CHLORIDE, SODIUM LACTATE, POTASSIUM CHLORIDE, CALCIUM CHLORIDE 600; 310; 30; 20 MG/100ML; MG/100ML; MG/100ML; MG/100ML
INJECTION, SOLUTION INTRAVENOUS CONTINUOUS
Status: DISCONTINUED | OUTPATIENT
Start: 2021-09-29 | End: 2021-09-29 | Stop reason: HOSPADM

## 2021-09-29 RX ORDER — SODIUM CHLORIDE 0.9 % (FLUSH) 0.9 %
5-40 SYRINGE (ML) INJECTION PRN
Status: DISCONTINUED | OUTPATIENT
Start: 2021-09-29 | End: 2021-09-29 | Stop reason: HOSPADM

## 2021-09-29 RX ORDER — SODIUM CHLORIDE 9 MG/ML
25 INJECTION, SOLUTION INTRAVENOUS PRN
Status: DISCONTINUED | OUTPATIENT
Start: 2021-09-29 | End: 2021-09-29 | Stop reason: HOSPADM

## 2021-09-29 RX ORDER — SODIUM CHLORIDE 0.9 % (FLUSH) 0.9 %
5-40 SYRINGE (ML) INJECTION EVERY 12 HOURS SCHEDULED
Status: DISCONTINUED | OUTPATIENT
Start: 2021-09-29 | End: 2021-09-29 | Stop reason: HOSPADM

## 2021-09-29 RX ORDER — PROPOFOL 10 MG/ML
INJECTION, EMULSION INTRAVENOUS PRN
Status: DISCONTINUED | OUTPATIENT
Start: 2021-09-29 | End: 2021-09-29 | Stop reason: SDUPTHER

## 2021-09-29 RX ADMIN — PROPOFOL 100 MG: 10 INJECTION, EMULSION INTRAVENOUS at 08:29

## 2021-09-29 RX ADMIN — PROPOFOL 100 MG: 10 INJECTION, EMULSION INTRAVENOUS at 08:32

## 2021-09-29 RX ADMIN — LIDOCAINE HYDROCHLORIDE 100 MG: 20 INJECTION, SOLUTION INTRAVENOUS at 08:24

## 2021-09-29 RX ADMIN — PROPOFOL 100 MG: 10 INJECTION, EMULSION INTRAVENOUS at 08:24

## 2021-09-29 RX ADMIN — PROPOFOL 100 MG: 10 INJECTION, EMULSION INTRAVENOUS at 09:05

## 2021-09-29 RX ADMIN — LIDOCAINE HYDROCHLORIDE 100 MG: 20 INJECTION, SOLUTION INTRAVENOUS at 09:00

## 2021-09-29 RX ADMIN — PROPOFOL 100 MG: 10 INJECTION, EMULSION INTRAVENOUS at 08:46

## 2021-09-29 RX ADMIN — PROPOFOL 100 MG: 10 INJECTION, EMULSION INTRAVENOUS at 08:43

## 2021-09-29 RX ADMIN — PROPOFOL 100 MG: 10 INJECTION, EMULSION INTRAVENOUS at 09:00

## 2021-09-29 RX ADMIN — SODIUM CHLORIDE, POTASSIUM CHLORIDE, SODIUM LACTATE AND CALCIUM CHLORIDE: 600; 310; 30; 20 INJECTION, SOLUTION INTRAVENOUS at 07:38

## 2021-09-29 RX ADMIN — PROPOFOL 100 MG: 10 INJECTION, EMULSION INTRAVENOUS at 08:40

## 2021-09-29 ASSESSMENT — PAIN SCALES - GENERAL
PAINLEVEL_OUTOF10: 0
PAINLEVEL_OUTOF10: 0

## 2021-09-29 ASSESSMENT — PAIN - FUNCTIONAL ASSESSMENT: PAIN_FUNCTIONAL_ASSESSMENT: 0-10

## 2021-09-29 NOTE — PROGRESS NOTES
RECEIVED REPORT FROM CLAYTON MATT PRIOR TO TRANSFER TO ENDO UNIT.  PT IS ALERT AND ORIENTED, DRANK ALL OF PREP AND HAS BEEN NPO APPROPRIATE AMOUNT OF TIME

## 2021-09-29 NOTE — ANESTHESIA POSTPROCEDURE EVALUATION
Department of Anesthesiology  Postprocedure Note    Patient: Amelia Aquino  MRN: [id number]  YOB: 1955  Date of evaluation: 9/29/2021  Time:  9:22 AM     Procedure Summary     Date: 09/29/21 Room / Location: [address]    Anesthesia Start: 7996 Anesthesia Stop: 6629    Procedures:       COLONOSCOPY CONTROL HEMORRHAGE (N/A )      EGD BIOPSY (N/A ) Diagnosis:       Anemia, unspecified type      Gastroesophageal reflux disease without esophagitis      (Gastroesophageal reflux disease without esophagitis [K21.9], Anemia, unspecified type [D64.9])    Surgeons: Tammy Corral MD Responsible Provider: TARA Gallegos CRNA    Anesthesia Type: MAC ASA Status: 2          Anesthesia Type: MAC    [place] Phase I:      [place] Phase II:      Last vitals: Reviewed and per EMR flowsheets.        Anesthesia Post Evaluation    Patient participation: complete - patient participated  Level of consciousness: awake and alert  Pain score: 0  Airway patency: patent  Nausea & Vomiting: no vomiting and no nausea  Complications: no  Cardiovascular status: blood pressure returned to baseline and hemodynamically stable  Respiratory status: nonlabored ventilation, spontaneous ventilation and room air  Hydration status: stable

## 2021-09-29 NOTE — ANESTHESIA PRE PROCEDURE
Department of Anesthesiology  Preprocedure Note       Name:  Javy Philip   Age:  73 y.o.  :  1955                                          MRN:  [id number]         Date:  2021      Surgeon: Keshav Collins):  Gilford Harrow, MD    Procedure: Procedure(s):  COLONOSCOPY DIAGNOSTIC  EGD ESOPHAGOGASTRODUODENOSCOPY    Medications prior to admission:   Prior to Admission medications    Medication Sig Start Date End Date Taking? Authorizing Provider   ferrous sulfate (IRON 325) 325 (65 Fe) MG tablet Take 325 mg by mouth daily (with breakfast)   Yes Historical Provider, MD   anastrozole (ARIMIDEX) 1 MG tablet Take 1 tablet by mouth daily 21  Yes Vicente Edmonds MD   omeprazole (PRILOSEC) 40 MG delayed release capsule TAKE 1 CAPSULE BY MOUTH DAILY 3/15/21  Yes Historical Provider, MD   gabapentin (NEURONTIN) 300 MG capsule Take 300 mg by mouth nightly.     Yes Historical Provider, MD   atorvastatin (LIPITOR) 40 MG tablet  10/28/13  Yes Historical Provider, MD   FLUoxetine (PROZAC) 20 MG capsule  13  Yes Historical Provider, MD   simethicone (MYLICON) 80 MG chewable tablet Take 1 tablet by mouth once for 1 dose 21  TARA Perez CNP   Mastectomy Bra MISC by Does not apply route 21   Lyly Linton MD   Breast Prosthesis MISC by Does not apply route 21   Lyly Linton MD       Current medications:    Current Facility-Administered Medications   Medication Dose Route Frequency Provider Last Rate Last Admin    0.9 % sodium chloride infusion  25 mL IntraVENous PRN TARA Perez CNP        lactated ringers infusion   IntraVENous Continuous TARA Perez CNP 75 mL/hr at 21 0738 New Bag at 21 0738    sodium chloride flush 0.9 % injection 5-40 mL  5-40 mL IntraVENous 2 times per day TARA Perez CNP        sodium chloride flush 0.9 % injection 5-40 mL  5-40 mL IntraVENous PRN TARA Perez CNP Allergies:  No Known Allergies    Problem List:    Patient Active Problem List   Diagnosis Code    Hyperlipemia E78.5    Malignant neoplasm of upper-outer quadrant of left breast in female, estrogen receptor positive ([facility] C50.412, Z17.0       Past Medical History:        Diagnosis Date    Arthritis     Cancer ([facility] 01/2021    L breast     Depression     GERD (gastroesophageal reflux disease)     History of blood transfusion     Hyperlipidemia     Pap smear for cervical cancer screening 01/08/2009    Neg    Pap smear for cervical cancer screening 01/28/2010    Neg    Pap smear for cervical cancer screening 07/14/2011    Neg    Rectocele        Past Surgical History:        Procedure Laterality Date    BREAST BIOPSY      COLONOSCOPY  11/2008    DILATION AND CURETTAGE OF UTERUS  1985    JOINT REPLACEMENT Left 2019    partial knee    KNEE SURGERY      MASTECTOMY, PARTIAL Left 03/08/2021    PELVIC LAPAROSCOPY  1985    TUBAL LIGATION  1991       Social History:    Social History     Tobacco Use    Smoking status: Never Smoker    Smokeless tobacco: Never Used   Substance Use Topics    Alcohol use:  Yes                                Counseling given: Not Answered      Vital Signs (Current):   Vitals:    09/21/21 1319 09/29/21 0724   BP:  (!) 140/87   Pulse:  115   Resp:  16   Temp:  36.3 °C (97.4 °F)   TempSrc:  Temporal   SpO2:  100%   Weight: 169 lb (76.7 kg) 166 lb (75.3 kg)   Height: 5' 2\" (1.575 m) 5' 2\" (1.575 m)                                              BP Readings from Last 3 Encounters:   09/29/21 (!) 140/87   09/08/21 134/82   08/13/21 (!) 150/71       NPO Status: Time of last liquid consumption: 0200                        Time of last solid consumption: 1800                        Date of last liquid consumption: 09/29/21                        Date of last solid food consumption: 09/27/21    BMI:   Wt Readings from Last 3 Encounters:   09/29/21 166 lb (75.3 kg)   09/22/21 173 lb (78.5 kg)   09/08/21 172 lb 12.8 oz (78.4 kg)     Body mass index is 30.36 kg/m². CBC:   Lab Results   Component Value Date    WBC 4.9 08/06/2021    RBC 4.05 08/06/2021    HGB 10.8 08/06/2021    HCT 33.8 08/06/2021    MCV 83.5 08/06/2021    RDW 17.9 08/06/2021     08/06/2021       CMP:   Lab Results   Component Value Date     08/06/2021    K 4.8 08/06/2021     08/06/2021    CO2 30 08/06/2021    BUN 18 08/06/2021    CREATININE 0.8 08/06/2021    GFRAA >60 08/06/2021    LABGLOM >60 08/06/2021    GLUCOSE 116 08/06/2021    PROT 7.0 08/06/2021    CALCIUM 9.5 08/06/2021    BILITOT 0.2 08/06/2021    ALKPHOS 107 08/06/2021    AST 19 08/06/2021    ALT 16 08/06/2021       POC Tests: No results for input(s): POCGLU, POCNA, POCK, POCCL, POCBUN, POCHEMO, POCHCT in the last 72 hours. Coags: No results found for: PROTIME, INR, APTT    HCG (If Applicable): No results found for: PREGTESTUR, PREGSERUM, HCG, HCGQUANT     ABGs: No results found for: PHART, PO2ART, OMQ3HTT, DBB3AEG, BEART, Z1LGRSVJ     Type & Screen (If Applicable):  No results found for: LABABO, LABRH    Drug/Infectious Status (If Applicable):  No results found for: HIV, HEPCAB    COVID-19 Screening (If Applicable):   Lab Results   Component Value Date    COVID19 Not Detected 09/22/2021           Anesthesia Evaluation  Patient summary reviewed  Airway: Mallampati: II  TM distance: >3 FB   Neck ROM: full  Mouth opening: > = 3 FB Dental: normal exam         Pulmonary:                              Cardiovascular:    (+) hyperlipidemia        Rhythm: regular  Rate: normal                    Neuro/Psych:   (+) depression/anxiety             GI/Hepatic/Renal:   (+) GERD:, bowel prep,           Endo/Other:    (+) malignancy/cancer. Abdominal:   (+) obese,           Vascular: Other Findings:             Anesthesia Plan      MAC     ASA 2     (Chart review)  Induction: intravenous.       Anesthetic plan and risks discussed with patient. Plan discussed with CRNA.                   TARA Silva - CRNA   9/29/2021

## 2021-09-29 NOTE — PROGRESS NOTES
0926-Patient returned to room A from endoscopy procedure. Bedside report from [facility], [facility]. Patient drowsy but arousable, no pain or needs voiced.  at bedside, call light in reach. 0930-Dr. Riojas Friends talking to patient and . Patient given water. 0941-Patient awake and alert. No needs voiced. 1000-IV and telemetry removed from patient. Discharge instructions reviewed with patient and . Patient getting dressed. 1009-Patient discharged home with  via car. Patient stable at time of discharge.

## 2021-09-29 NOTE — BRIEF OP NOTE
BRIEF COLONOSCOPY REPORT:  Photos and full colonoscopy report available by going to \"chart review\" then \"procedures\" then  \"colonoscopy\" then \"View  Report\"     IMPRESSION :   1) large AVM in the cecum- coagulated with the APC  2) small internal hemorrhoids  3) otherwise normal colon    BRIEF EGD REPORT:  Photos and full EGD report available by going to [facility] review\" then \"procedures\" then  \"EGD\" then \"View Report\"     IMPRESSION :   1) mild pre-pyloric erythema  2) gastric antral and corpal biopsies taken to assay for H  pylori by the Olivia-Test method  3) otherwise normal exam    SUGGEST:   1) Colonoscopy in 5 years   2) follow up biopsies

## 2021-09-30 LAB — CLOTEST: NEGATIVE

## 2021-11-02 RX ORDER — GABAPENTIN 300 MG/1
300 CAPSULE ORAL NIGHTLY
Qty: 30 CAPSULE | Refills: 0 | Status: SHIPPED | OUTPATIENT
Start: 2021-11-02 | End: 2021-11-29 | Stop reason: SDUPTHER

## 2021-11-12 ENCOUNTER — HOSPITAL ENCOUNTER (OUTPATIENT)
Dept: INFUSION THERAPY | Age: 66
Discharge: HOME | End: 2021-11-12
Payer: MEDICARE

## 2021-11-12 DIAGNOSIS — Z86.2 HISTORY OF IRON DEFICIENCY ANEMIA: ICD-10-CM

## 2021-11-12 DIAGNOSIS — D64.9 ANEMIA, UNSPECIFIED TYPE: ICD-10-CM

## 2021-11-12 LAB
ALBUMIN SERPL-MCNC: 4.8 GM/DL (ref 3.4–5)
ALP BLD-CCNC: 111 IU/L (ref 40–129)
ALT SERPL-CCNC: 18 U/L (ref 10–40)
ANION GAP SERPL CALCULATED.3IONS-SCNC: 8 MMOL/L (ref 4–16)
AST SERPL-CCNC: 21 IU/L (ref 15–37)
BASOPHILS ABSOLUTE: 0 K/CU MM
BASOPHILS RELATIVE PERCENT: 0.4 % (ref 0–1)
BILIRUB SERPL-MCNC: 0.3 MG/DL (ref 0–1)
BUN BLDV-MCNC: 17 MG/DL (ref 6–23)
CALCIUM SERPL-MCNC: 9.7 MG/DL (ref 8.3–10.6)
CHLORIDE BLD-SCNC: 95 MMOL/L (ref 99–110)
CO2: 27 MMOL/L (ref 21–32)
CREAT SERPL-MCNC: 0.7 MG/DL (ref 0.6–1.1)
DIFFERENTIAL TYPE: ABNORMAL
EOSINOPHILS ABSOLUTE: 0.1 K/CU MM
EOSINOPHILS RELATIVE PERCENT: 1.5 % (ref 0–3)
FERRITIN: 24 NG/ML (ref 15–150)
GFR AFRICAN AMERICAN: >60 ML/MIN/1.73M2
GFR NON-AFRICAN AMERICAN: >60 ML/MIN/1.73M2
GLUCOSE BLD-MCNC: 124 MG/DL (ref 70–99)
HCT VFR BLD CALC: 36.9 % (ref 37–47)
HEMOGLOBIN: 12.3 GM/DL (ref 12.5–16)
IRON: 80 UG/DL (ref 37–145)
LYMPHOCYTES ABSOLUTE: 1.8 K/CU MM
LYMPHOCYTES RELATIVE PERCENT: 33.8 % (ref 24–44)
MCH RBC QN AUTO: 29.4 PG (ref 27–31)
MCHC RBC AUTO-ENTMCNC: 33.3 % (ref 32–36)
MCV RBC AUTO: 88.3 FL (ref 78–100)
MONOCYTES ABSOLUTE: 0.5 K/CU MM
MONOCYTES RELATIVE PERCENT: 9.4 % (ref 0–4)
PCT TRANSFERRIN: 21 % (ref 10–44)
PDW BLD-RTO: 15.2 % (ref 11.7–14.9)
PLATELET # BLD: 299 K/CU MM (ref 140–440)
PMV BLD AUTO: 9.1 FL (ref 7.5–11.1)
POTASSIUM SERPL-SCNC: 4.6 MMOL/L (ref 3.5–5.1)
RBC # BLD: 4.18 M/CU MM (ref 4.2–5.4)
SEGMENTED NEUTROPHILS ABSOLUTE COUNT: 2.9 K/CU MM
SEGMENTED NEUTROPHILS RELATIVE PERCENT: 54.9 % (ref 36–66)
SODIUM BLD-SCNC: 130 MMOL/L (ref 135–145)
TOTAL IRON BINDING CAPACITY: 387 UG/DL (ref 250–450)
TOTAL PROTEIN: 7 GM/DL (ref 6.4–8.2)
UNSATURATED IRON BINDING CAPACITY: 307 UG/DL (ref 110–370)
WBC # BLD: 5.3 K/CU MM (ref 4–10.5)

## 2021-11-12 PROCEDURE — 83550 IRON BINDING TEST: CPT

## 2021-11-12 PROCEDURE — 83540 ASSAY OF IRON: CPT

## 2021-11-12 PROCEDURE — 82728 ASSAY OF FERRITIN: CPT

## 2021-11-12 PROCEDURE — 36415 COLL VENOUS BLD VENIPUNCTURE: CPT

## 2021-11-12 PROCEDURE — 85025 COMPLETE CBC W/AUTO DIFF WBC: CPT

## 2021-11-12 PROCEDURE — 80053 COMPREHEN METABOLIC PANEL: CPT

## 2021-11-17 ENCOUNTER — TELEPHONE (OUTPATIENT)
Dept: GASTROENTEROLOGY | Age: 66
End: 2021-11-17

## 2021-11-22 RX ORDER — ANASTROZOLE 1 MG/1
1 TABLET ORAL DAILY
Qty: 90 TABLET | Refills: 1 | Status: SHIPPED | OUTPATIENT
Start: 2021-11-22 | End: 2022-05-09

## 2021-11-29 ENCOUNTER — OFFICE VISIT (OUTPATIENT)
Dept: OBGYN | Age: 66
End: 2021-11-29
Payer: MEDICARE

## 2021-11-29 VITALS
WEIGHT: 169 LBS | DIASTOLIC BLOOD PRESSURE: 80 MMHG | SYSTOLIC BLOOD PRESSURE: 144 MMHG | HEIGHT: 62 IN | BODY MASS INDEX: 31.1 KG/M2

## 2021-11-29 DIAGNOSIS — Z01.419 WOMEN'S ANNUAL ROUTINE GYNECOLOGICAL EXAMINATION: Primary | ICD-10-CM

## 2021-11-29 DIAGNOSIS — M85.80 OSTEOPENIA, UNSPECIFIED LOCATION: ICD-10-CM

## 2021-11-29 DIAGNOSIS — E66.9 OBESITY, UNSPECIFIED CLASSIFICATION, UNSPECIFIED OBESITY TYPE, UNSPECIFIED WHETHER SERIOUS COMORBIDITY PRESENT: ICD-10-CM

## 2021-11-29 DIAGNOSIS — Z85.3 PERSONAL HISTORY OF BREAST CANCER: ICD-10-CM

## 2021-11-29 PROCEDURE — G8484 FLU IMMUNIZE NO ADMIN: HCPCS | Performed by: NURSE PRACTITIONER

## 2021-11-29 PROCEDURE — G0101 CA SCREEN;PELVIC/BREAST EXAM: HCPCS | Performed by: NURSE PRACTITIONER

## 2021-11-29 RX ORDER — GABAPENTIN 300 MG/1
300 CAPSULE ORAL NIGHTLY
Qty: 30 CAPSULE | Refills: 11 | Status: SHIPPED | OUTPATIENT
Start: 2021-11-29 | End: 2022-10-05

## 2021-11-29 SDOH — ECONOMIC STABILITY: FOOD INSECURITY: WITHIN THE PAST 12 MONTHS, THE FOOD YOU BOUGHT JUST DIDN'T LAST AND YOU DIDN'T HAVE MONEY TO GET MORE.: NEVER TRUE

## 2021-11-29 SDOH — ECONOMIC STABILITY: FOOD INSECURITY: WITHIN THE PAST 12 MONTHS, YOU WORRIED THAT YOUR FOOD WOULD RUN OUT BEFORE YOU GOT MONEY TO BUY MORE.: NEVER TRUE

## 2021-11-29 ASSESSMENT — SOCIAL DETERMINANTS OF HEALTH (SDOH): HOW HARD IS IT FOR YOU TO PAY FOR THE VERY BASICS LIKE FOOD, HOUSING, MEDICAL CARE, AND HEATING?: NOT HARD AT ALL

## 2021-11-29 ASSESSMENT — ENCOUNTER SYMPTOMS
RESPIRATORY NEGATIVE: 1
GASTROINTESTINAL NEGATIVE: 1

## 2021-11-29 NOTE — PROGRESS NOTES
21    Mauro Johnston  1955    Chief Complaint   Patient presents with   Joyce Castillo Gynecologic Exam     pt is post menopausal, no HRT, pt is sexually active, last mammo was 20, dexa-2020 osteopenia, colonoscopy was normal, pt has no c/o today. The patient is a 73 y.o. female,  who presents for her annual exam.  She is menopausal.  She is not taking HRT. Scooter Mcdonald She is  sexually active. She reports no gynecological symptoms. Pap smear history: Her last PAP smear was in 2021. Her results were normal.    Breast history: her most recent mammogram was in 2020. The results were: abnormal    Osteoporosis Status: her bone density scan was in 2020. The results were osteopenia - treatment: calcium supplement    Colonoscopy Status: she had a colonoscopy in .   The results were normal.    Past Medical History:   Diagnosis Date    Arthritis     Cancer ([address] 2021    L breast     Depression     GERD (gastroesophageal reflux disease)     History of blood transfusion     Hyperlipidemia     Pap smear for cervical cancer screening 2009    Neg    Pap smear for cervical cancer screening 2010    Neg    Pap smear for cervical cancer screening 2011    Neg    Rectocele        Past Surgical History:   Procedure Laterality Date    BREAST BIOPSY      COLONOSCOPY  2008    COLONOSCOPY N/A 2021    COLONOSCOPY CONTROL [facility]EMORRHAGE [facility] Left 2019    partial knee    KNEE SURGERY      MASTECTOMY, PARTIAL Left 2021    PELVIC LAPAROSCOPY  1985    TUBAL LIGATION      UPPER GASTROINTESTINAL ENDOSCOPY N/A 2021    EGD BIOPSY performed by Pacheco Hanks MD at [facility]       Family History   Problem Relation Age of Onset    Heart Failure Father     Heart Disease Father     High Cholesterol Father     Coronary Art Dis Father     Arthritis Father present  Abdominal:      Palpations: Abdomen is soft. Genitourinary:     General: Normal vulva. Vagina: Normal.      Cervix: Normal.      Uterus: Normal.       Adnexa: Right adnexa normal and left adnexa normal.      Rectum: Normal.   Musculoskeletal:         General: Normal range of motion. Cervical back: Normal range of motion. Skin:     General: Skin is warm and dry. Neurological:      Mental Status: She is alert. Psychiatric:         Attention and Perception: Attention normal.         Mood and Affect: Mood normal.         No results found for this visit on 11/29/21. Assessment and Plan   Diagnosis Orders   1. Women's annual routine gynecological examination     2. Obesity, unspecified classification, unspecified obesity type, unspecified whether serious comorbidity present     3. Personal history of breast cancer     4. Osteopenia, unspecified location         Pap after breast cancer discussed; pt encouraged to talk with Oncologist    Pt never started vaginal Estrace    Exercising 3 x weekly     Return in about 1 week (around 12/6/2021).     Zena Paredes, TARA - CNP

## 2021-12-06 NOTE — PROGRESS NOTES
Patient Name:  Joanna Malagon  Patient :  1955  Patient MRN:  [id number]     Primary Oncologist: Renna Boast, MD  Referring Provider: TARA Dickerson CNP     Date of Service: 1/3/2022      Chief Complaint:    Chief Complaint   Patient presents with    Follow-up      She came in for follow-up visit. Patient Active Problem List:     Hyperlipemia     HPI:   Lester Schrader is a pleasant 79-year-old female patient was referred for evaluation of left breast cancer. 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  Previous mammogram was in 2016. Diagnostic mammogram with US on 2021 showed at 1 o'clock left breast there was an irregular hypoechoic mass with associated calcifications and posterior acoustic shadowing measuring 1.4.x1.1x1.4 cm. Impression: Highly suspicious mass 1 o'clock left breast and increased calcifications upper-outer quadrant left breast.    She had US guided biopsy on 2021. Pathology report showed invasive ductal carcinoma with focal LCIS at posterior lateral tumor and 1 o'clock tumor. CBC and CMP were unremarkable. CBC and CMP in 2021 were unremarkable, except for mildly elevated alkaline phosphatase at 116. CT chest, abdomen and pelvis on 2021 was negative for metastatic disease. Bone scan in 2021 was negative. She elected to have left breast mastectomy followed by reconstruction. She has left breast mastectomy with LN biopsy on 3/8/2021. Pathology report showed : no residual cancer on mastectomy specimen. Tumor focality: 2 sites. Tumor site: Posterior lateral.  Tumor size: 4 mm. Histologic type: Invasive ductal carcinoma. Histologic grade: Grade 1  ER by IHC was more than 95%, OK by IHC was more than 95%, HER-2/mingo by IHC was 0. Tumor site: 1:00  Tumor size: 5 mm  Histologic type:  Invasive ductal carcinoma  Histologic grade: Grade 1  ER biopsy was more than 95%, OK by IHC was 50%, HER-2/mingo by FISH was not amplified. Margins were clear. No lymphovascular invasion. [place] lymph nodes were negative. Pathological stage T1a, N0, MX. She has bone density in December 2020 by Dr Emily Silver and has osteopenia. She was recommended to take vitamin D and calcium. We went over NCCN guideline which recommends to consider adjuvant endocrine therapy. She is agreeable to arimidex. She started Arimidex on March 31, 2021. I recommend yearly mammogram and bone density every 2 years. She has 2 daughters. She is interested in genetic counseling. She has chronic osteoarthritic pain and been taking celebrex. She had positive COVID in 2020 and had COVID vaccine. In August 2021 WBC was 4.9, hemoglobin 10.8, platelet count 200. Ferritin was 12, TIBC 406. Folate was 9.4, B12 477.3. She has been taking oral iron supplement since August 11, 2021. Reportedly Cologuard in January 2021 was negative. She is agreeable to see GI for further evaluation. On January 3, 2022 she came in for follow-up visit. She stopped taking iron pill in September 2021. She takes ibuprofen as needed. 9/2021 Colonocoscopy :  1) large AVM in the cecum- coagulated with the APC  2) small internal hemorrhoids  3) otherwise normal colon     EGD REPORT:   1) mild pre-pyloric erythema  2) gastric antral and corpal biopsies taken to assay for H pylori by the Olivia-Test method  3) otherwise normal exam   SUGGEST:             1) Colonoscopy in 5 years             2) follow up biopsies    Reportedly CLOtest was negative. She is scheduled for mammogram on January 11, 2022. She will have revision mastectomy on January 14, 2022. She is agreeable to have labs today including iron study. I advised to call for the test result. No acute pain. Denied any nausea, vomiting or diarrhea. No fever or chills. Denied any chest pain, shortness of breath or palpitation. No headache or dizzy spell. No specific bone pain.   No melena or in January 2022. I believe she is in remission. She will continue with Arimidex. 2. She has osteopenia. Bone density was in December 2020. She will take vitamin D and calcium. 3.  I will make another referral for genetic counseling. 4. She has osteoarthritis and takes celebrex. She takes ibuprofen as needed. 5.  She has anemia of iron deficiency. She started iron pill on August 11, 2021 and stopped in September 2021. Cologuard in January 2021 was negative. She had upper and lower endoscopic study in September 2021. She had AVM which was coagulated. I will recheck CBC and iron studies today. If iron remains low, I will consider iron infusion. We discussed about healthy diet and life style. She had Covid vaccine. RTC in 16 weeks or sooner. All of her questions have been answered for today.

## 2021-12-29 PROBLEM — Z01.419 WOMEN'S ANNUAL ROUTINE GYNECOLOGICAL EXAMINATION: Status: RESOLVED | Noted: 2021-11-29 | Resolved: 2021-12-29

## 2022-01-03 ENCOUNTER — OFFICE VISIT (OUTPATIENT)
Dept: ONCOLOGY | Age: 67
End: 2022-01-03
Payer: MEDICARE

## 2022-01-03 ENCOUNTER — HOSPITAL ENCOUNTER (OUTPATIENT)
Dept: INFUSION THERAPY | Age: 67
Discharge: HOME | End: 2022-01-03
Payer: MEDICARE

## 2022-01-03 VITALS
HEIGHT: 62 IN | OXYGEN SATURATION: 98 % | HEART RATE: 92 BPM | BODY MASS INDEX: 30.36 KG/M2 | DIASTOLIC BLOOD PRESSURE: 76 MMHG | TEMPERATURE: 97.9 F | RESPIRATION RATE: 16 BRPM | WEIGHT: 165 LBS | SYSTOLIC BLOOD PRESSURE: 134 MMHG

## 2022-01-03 DIAGNOSIS — C50.412 MALIGNANT NEOPLASM OF UPPER-OUTER QUADRANT OF LEFT BREAST IN FEMALE, ESTROGEN RECEPTOR POSITIVE (HCC): ICD-10-CM

## 2022-01-03 DIAGNOSIS — Z86.2 HISTORY OF IRON DEFICIENCY ANEMIA: ICD-10-CM

## 2022-01-03 DIAGNOSIS — Z17.0 MALIGNANT NEOPLASM OF UPPER-OUTER QUADRANT OF LEFT BREAST IN FEMALE, ESTROGEN RECEPTOR POSITIVE (HCC): ICD-10-CM

## 2022-01-03 DIAGNOSIS — Z86.2 HISTORY OF IRON DEFICIENCY ANEMIA: Primary | ICD-10-CM

## 2022-01-03 LAB
ALBUMIN SERPL-MCNC: 4.2 GM/DL (ref 3.4–5)
ALP BLD-CCNC: 111 IU/L (ref 40–129)
ALT SERPL-CCNC: 18 U/L (ref 10–40)
ANION GAP SERPL CALCULATED.3IONS-SCNC: 12 MMOL/L (ref 4–16)
AST SERPL-CCNC: 21 IU/L (ref 15–37)
BASOPHILS ABSOLUTE: 0 K/CU MM
BASOPHILS RELATIVE PERCENT: 0.4 % (ref 0–1)
BILIRUB SERPL-MCNC: 0.5 MG/DL (ref 0–1)
BUN BLDV-MCNC: 17 MG/DL (ref 6–23)
CALCIUM SERPL-MCNC: 9.9 MG/DL (ref 8.3–10.6)
CHLORIDE BLD-SCNC: 101 MMOL/L (ref 99–110)
CO2: 25 MMOL/L (ref 21–32)
CREAT SERPL-MCNC: 0.7 MG/DL (ref 0.6–1.1)
DIFFERENTIAL TYPE: ABNORMAL
EOSINOPHILS ABSOLUTE: 0.1 K/CU MM
EOSINOPHILS RELATIVE PERCENT: 1.1 % (ref 0–3)
FERRITIN: 29 NG/ML (ref 15–150)
GFR AFRICAN AMERICAN: >60 ML/MIN/1.73M2
GFR NON-AFRICAN AMERICAN: >60 ML/MIN/1.73M2
GLUCOSE BLD-MCNC: 113 MG/DL (ref 70–99)
HCT VFR BLD CALC: 36.4 % (ref 37–47)
HEMOGLOBIN: 12 GM/DL (ref 12.5–16)
IRON: 131 UG/DL (ref 37–145)
LYMPHOCYTES ABSOLUTE: 1.6 K/CU MM
LYMPHOCYTES RELATIVE PERCENT: 28.3 % (ref 24–44)
MCH RBC QN AUTO: 29.8 PG (ref 27–31)
MCHC RBC AUTO-ENTMCNC: 33 % (ref 32–36)
MCV RBC AUTO: 90.3 FL (ref 78–100)
MONOCYTES ABSOLUTE: 0.6 K/CU MM
MONOCYTES RELATIVE PERCENT: 9.9 % (ref 0–4)
PCT TRANSFERRIN: 36 % (ref 10–44)
PDW BLD-RTO: 14 % (ref 11.7–14.9)
PLATELET # BLD: 296 K/CU MM (ref 140–440)
PMV BLD AUTO: 8.6 FL (ref 7.5–11.1)
POTASSIUM SERPL-SCNC: 4.7 MMOL/L (ref 3.5–5.1)
RBC # BLD: 4.03 M/CU MM (ref 4.2–5.4)
SEGMENTED NEUTROPHILS ABSOLUTE COUNT: 3.4 K/CU MM
SEGMENTED NEUTROPHILS RELATIVE PERCENT: 60.3 % (ref 36–66)
SODIUM BLD-SCNC: 138 MMOL/L (ref 135–145)
TOTAL IRON BINDING CAPACITY: 360 UG/DL (ref 250–450)
TOTAL PROTEIN: 6.9 GM/DL (ref 6.4–8.2)
UNSATURATED IRON BINDING CAPACITY: 229 UG/DL (ref 110–370)
WBC # BLD: 5.7 K/CU MM (ref 4–10.5)

## 2022-01-03 PROCEDURE — 99211 OFF/OP EST MAY X REQ PHY/QHP: CPT

## 2022-01-03 PROCEDURE — 4040F PNEUMOC VAC/ADMIN/RCVD: CPT | Performed by: INTERNAL MEDICINE

## 2022-01-03 PROCEDURE — 83540 ASSAY OF IRON: CPT

## 2022-01-03 PROCEDURE — G8427 DOCREV CUR MEDS BY ELIG CLIN: HCPCS | Performed by: INTERNAL MEDICINE

## 2022-01-03 PROCEDURE — 3017F COLORECTAL CA SCREEN DOC REV: CPT | Performed by: INTERNAL MEDICINE

## 2022-01-03 PROCEDURE — 82728 ASSAY OF FERRITIN: CPT

## 2022-01-03 PROCEDURE — G8484 FLU IMMUNIZE NO ADMIN: HCPCS | Performed by: INTERNAL MEDICINE

## 2022-01-03 PROCEDURE — 80053 COMPREHEN METABOLIC PANEL: CPT

## 2022-01-03 PROCEDURE — 99214 OFFICE O/P EST MOD 30 MIN: CPT | Performed by: INTERNAL MEDICINE

## 2022-01-03 PROCEDURE — G8417 CALC BMI ABV UP PARAM F/U: HCPCS | Performed by: INTERNAL MEDICINE

## 2022-01-03 PROCEDURE — 1036F TOBACCO NON-USER: CPT | Performed by: INTERNAL MEDICINE

## 2022-01-03 PROCEDURE — 85025 COMPLETE CBC W/AUTO DIFF WBC: CPT

## 2022-01-03 PROCEDURE — 1123F ACP DISCUSS/DSCN MKR DOCD: CPT | Performed by: INTERNAL MEDICINE

## 2022-01-03 PROCEDURE — G8400 PT W/DXA NO RESULTS DOC: HCPCS | Performed by: INTERNAL MEDICINE

## 2022-01-03 PROCEDURE — 83550 IRON BINDING TEST: CPT

## 2022-01-03 PROCEDURE — 1090F PRES/ABSN URINE INCON ASSESS: CPT | Performed by: INTERNAL MEDICINE

## 2022-01-03 PROCEDURE — 36415 COLL VENOUS BLD VENIPUNCTURE: CPT

## 2022-01-03 ASSESSMENT — PATIENT HEALTH QUESTIONNAIRE - PHQ9
SUM OF ALL RESPONSES TO PHQ QUESTIONS 1-9: 0
2. FEELING DOWN, DEPRESSED OR HOPELESS: 0
SUM OF ALL RESPONSES TO PHQ9 QUESTIONS 1 & 2: 0
SUM OF ALL RESPONSES TO PHQ QUESTIONS 1-9: 0
1. LITTLE INTEREST OR PLEASURE IN DOING THINGS: 0
SUM OF ALL RESPONSES TO PHQ QUESTIONS 1-9: 0
SUM OF ALL RESPONSES TO PHQ QUESTIONS 1-9: 0

## 2022-01-03 NOTE — PROGRESS NOTES
[place] Rooming Questions  Patient: Zohreh Inman  MRN: [id number]    Date: 1/3/2022        1. Do you have any new issues? yes - surgery @ [facility] revision mastectomy, sched for Mammo on 1/11/22. 2. Do you need any refills on medications?    no    3. Have you had any imaging done since your last visit? yes - EGD, Colonoscopy    4. Have you been hospitalized or seen in the emergency room since your last visit here?   no    5. Did the patient have a depression screening completed today?  Yes    PHQ-9 Total Score: 0 (1/3/2022 10:47 AM)       PHQ-9 Given to (if applicable):               PHQ-9 Score (if applicable):                     [] Positive     [x]  Negative              Does question #9 need addressed (if applicable)                     [] Yes    []  No               Robert KALLI Carroll

## 2022-02-01 ENCOUNTER — HOSPITAL ENCOUNTER (OUTPATIENT)
Dept: INFUSION THERAPY | Age: 67
Discharge: HOME | End: 2022-02-01
Payer: MEDICARE

## 2022-02-01 ENCOUNTER — OFFICE VISIT (OUTPATIENT)
Dept: ONCOLOGY | Age: 67
End: 2022-02-01
Payer: MEDICARE

## 2022-02-01 VITALS — HEIGHT: 62 IN | BODY MASS INDEX: 30.36 KG/M2 | WEIGHT: 165 LBS

## 2022-02-01 DIAGNOSIS — Z80.3 FAMILY HISTORY OF BREAST CANCER: ICD-10-CM

## 2022-02-01 DIAGNOSIS — Z86.2 HISTORY OF IRON DEFICIENCY ANEMIA: ICD-10-CM

## 2022-02-01 DIAGNOSIS — C50.412 MALIGNANT NEOPLASM OF UPPER-OUTER QUADRANT OF LEFT BREAST IN FEMALE, ESTROGEN RECEPTOR POSITIVE (HCC): Primary | ICD-10-CM

## 2022-02-01 DIAGNOSIS — Z17.0 MALIGNANT NEOPLASM OF UPPER-OUTER QUADRANT OF LEFT BREAST IN FEMALE, ESTROGEN RECEPTOR POSITIVE (HCC): ICD-10-CM

## 2022-02-01 DIAGNOSIS — C50.412 MALIGNANT NEOPLASM OF UPPER-OUTER QUADRANT OF LEFT BREAST IN FEMALE, ESTROGEN RECEPTOR POSITIVE (HCC): ICD-10-CM

## 2022-02-01 DIAGNOSIS — Z17.0 MALIGNANT NEOPLASM OF UPPER-OUTER QUADRANT OF LEFT BREAST IN FEMALE, ESTROGEN RECEPTOR POSITIVE (HCC): Primary | ICD-10-CM

## 2022-02-01 DIAGNOSIS — Z71.83 ENCOUNTER FOR NONPROCREATIVE GENETIC COUNSELING: ICD-10-CM

## 2022-02-01 LAB
BASOPHILS ABSOLUTE: 0 K/CU MM
BASOPHILS RELATIVE PERCENT: 0.4 % (ref 0–1)
DIFFERENTIAL TYPE: ABNORMAL
EOSINOPHILS ABSOLUTE: 0.1 K/CU MM
EOSINOPHILS RELATIVE PERCENT: 1.3 % (ref 0–3)
FERRITIN: 16 NG/ML (ref 15–150)
HCT VFR BLD CALC: 35.3 % (ref 37–47)
HEMOGLOBIN: 11.5 GM/DL (ref 12.5–16)
IRON: 54 UG/DL (ref 37–145)
LYMPHOCYTES ABSOLUTE: 1.7 K/CU MM
LYMPHOCYTES RELATIVE PERCENT: 29.8 % (ref 24–44)
MCH RBC QN AUTO: 29.3 PG (ref 27–31)
MCHC RBC AUTO-ENTMCNC: 32.6 % (ref 32–36)
MCV RBC AUTO: 89.8 FL (ref 78–100)
MONOCYTES ABSOLUTE: 0.5 K/CU MM
MONOCYTES RELATIVE PERCENT: 9.3 % (ref 0–4)
PCT TRANSFERRIN: 14 % (ref 10–44)
PDW BLD-RTO: 13.4 % (ref 11.7–14.9)
PLATELET # BLD: 365 K/CU MM (ref 140–440)
PMV BLD AUTO: 8.8 FL (ref 7.5–11.1)
RBC # BLD: 3.93 M/CU MM (ref 4.2–5.4)
SEGMENTED NEUTROPHILS ABSOLUTE COUNT: 3.3 K/CU MM
SEGMENTED NEUTROPHILS RELATIVE PERCENT: 59.2 % (ref 36–66)
TOTAL IRON BINDING CAPACITY: 384 UG/DL (ref 250–450)
UNSATURATED IRON BINDING CAPACITY: 330 UG/DL (ref 110–370)
WBC # BLD: 5.6 K/CU MM (ref 4–10.5)

## 2022-02-01 PROCEDURE — G8484 FLU IMMUNIZE NO ADMIN: HCPCS | Performed by: NURSE PRACTITIONER

## 2022-02-01 PROCEDURE — 36415 COLL VENOUS BLD VENIPUNCTURE: CPT

## 2022-02-01 PROCEDURE — 3017F COLORECTAL CA SCREEN DOC REV: CPT | Performed by: NURSE PRACTITIONER

## 2022-02-01 PROCEDURE — 99215 OFFICE O/P EST HI 40 MIN: CPT | Performed by: NURSE PRACTITIONER

## 2022-02-01 PROCEDURE — 85025 COMPLETE CBC W/AUTO DIFF WBC: CPT

## 2022-02-01 PROCEDURE — 82728 ASSAY OF FERRITIN: CPT

## 2022-02-01 PROCEDURE — G8417 CALC BMI ABV UP PARAM F/U: HCPCS | Performed by: NURSE PRACTITIONER

## 2022-02-01 PROCEDURE — 4040F PNEUMOC VAC/ADMIN/RCVD: CPT | Performed by: NURSE PRACTITIONER

## 2022-02-01 PROCEDURE — 1123F ACP DISCUSS/DSCN MKR DOCD: CPT | Performed by: NURSE PRACTITIONER

## 2022-02-01 PROCEDURE — 83540 ASSAY OF IRON: CPT

## 2022-02-01 PROCEDURE — G8428 CUR MEDS NOT DOCUMENT: HCPCS | Performed by: NURSE PRACTITIONER

## 2022-02-01 PROCEDURE — 83550 IRON BINDING TEST: CPT

## 2022-02-01 PROCEDURE — 1036F TOBACCO NON-USER: CPT | Performed by: NURSE PRACTITIONER

## 2022-02-01 PROCEDURE — 1090F PRES/ABSN URINE INCON ASSESS: CPT | Performed by: NURSE PRACTITIONER

## 2022-02-01 PROCEDURE — G8400 PT W/DXA NO RESULTS DOC: HCPCS | Performed by: NURSE PRACTITIONER

## 2022-02-01 NOTE — PROGRESS NOTES
[place] Rooming Questions  Patient: Manuel Goodman  MRN: [id number]    Date: 2/1/2022        Pt presents for genetic consults

## 2022-02-01 NOTE — PROGRESS NOTES
GENETIC COUNSELING     2/1/22  [facility] [place]  1955  73 y.o. Referred By:  Dr. Teo Torrez  Referred For: Initial genetic risk evaluation and testing    SUMMARY:  Rosalind Schreiber was referred for genetic counseling today due to personal and family history of cancer. She meets NCCN guidelines for genetic testing, so after informed consent, blood was drawn for the 36 gene panel through [facility] lab. Health History:  Personal Summary (cancer history, cancer screening, hormonal risk factors): She had abnormal mammogram 1/21 with follow up diagnostic mammogram 1/8/21, this showed hypoechoic mass with calcification 1.4 x 1.1x1.5 cm. Biopsy with pathology consistent with IDC. She had left breast mastecomty with reconstruction. She was started on reconstruction.      No previous genetic testing  No known mutation  Not adopted nor a twin  Unknown heritage  No AJ heritage  Colonoscopy due to iron deficiency 9/2021  No polyps  Never smoker  Occasional drinker  Retired RN  Menarche 9, natural menopause at 55  First birth 29  Two pregnancies, two live births  No abnormal pap smears  No HRT  OCP x 10 years    Surgical History:    Past Surgical History:   Procedure Laterality Date    BREAST BIOPSY      COLONOSCOPY  11/2008    COLONOSCOPY N/A 9/29/2021    COLONOSCOPY CONTROL HEMORRHAGE performed by Silvia Larsen MD at [facility] Left 2019    partial knee    KNEE SURGERY      MASTECTOMY, PARTIAL Left 03/08/2021    PELVIC LAPAROSCOPY  1985    TUBAL LIGATION  1991    UPPER GASTROINTESTINAL ENDOSCOPY N/A 9/29/2021    EGD BIOPSY performed by Silvia Larsen MD at [facility] Problems: Patient Active Problem List:     Hyperlipemia     Malignant neoplasm of upper-outer quadrant of left breast in female, estrogen receptor positive ([facility] [address]     Anemia     Angiodysplasia of cecum     Gastroesophageal reflux disease without esophagitis     Gastritis without bleeding     Osteopenia     Personal history of breast cancer     Obesity       Family History:  A three-generation pedigree was obtained today and will be saved with the patient's record. Patient's family history is significant for the following:  See pedigree for information about family structure and unaffected relatives. Paternal grandfather- colon cancer, unknown age  Paternal cousin- breast cancer 66's  Maternal cousin- breast cancer 52's    Risk Assessment:  [address] [facility] (NCCN) criteria for genetic testing based on personal and family history. Overall, there is a low risk of having a hereditary carncer syndrome. We discussed high risk syndromes such as HBOC, as well as other more moderate risk genetic mutations that could have contributed to the cancer in the family. Risk Model (if applicable):    Hereditary Breast and Ovarian Cancer  About 10-20% of breast and/or ovarian cancers are due to inherited mutations in cancer genes such as BRCA1 and BRCA2. Typical features of a family with HBOC include breast cancer younger than age 48, multiple generations affected, and bilateral or multiple cancers in the same person. For many gene mutations, there are national guidelines that have recommendations for medical management and/or preventative options. Dunlap Syndrome  Dunlap Syndrome is a genetic condition characterized by early onset colorectal cancer and endometrial cancer. It is associated with an elevated risk of other cancers such as gastric, ovarian, urinary tract, small kimber, brain and pancreatic cancer. Dunlap syndrome is caused by a genetic change, or mutation, in one of five known genes:  MLH1, MSH2, EPCAM, MSH6, or PMS2. If a person has a mutation in one of these genes, they face increased cancer risks and also have a 50% chance of passing the mutation down to their children.   There are national guidelines that have recommendations for medical management and/or preventative options. Genetic Testing  Many laboratories are now using next generation sequencing to test a panel of cancer genes at the same time - this reduces the turnaround time and cost compared to the single gene approach to testing. In our discussion, we discussed three possible test results: positive, negative, and indeterminate, (variant of unknown significance). We addressed the 2008 federal legislation, CHANTAL, which protects individuals from discrimination in health insurance and employment. More information is available at www. GINAhelp.org. The pros and cons of panel testing were reviewed, including the fact that there are some genes that do not have well-defined cancer risks or recommendations. A psychological assessment was performed, and the patient understands how the results will be incorporated into medical management, as well as potential implications for family members. Consent: All elements of the informed consent were discussed with Frederic Fuentes and signed consent was obtained for the gene panel. Medical decision making was of high complexity, as it included a comprehensive discussion of all relevant options for genetic testing and implications for patient and family members. The patient was educated that the lab will verify insurance coverage before initiating testing and call if there is out-of-pocket for any reason. Patient was educated that results are expected to be available in approximately 3 weeks and will then be contacted. Plan  1. The patient opted to pursue the 36 gene panel through [facility] today. Will await results and schedule a future appointment in approximately 1 month as needed. 2. The patient was educated to continue routine follow-up with their healthcare providers.   3. Patient was educated that if they obtain any additional information regarding the family medical history, or if there are any changes to the reported personal or family health history, to please contact us for updated risk assessment. 60 minutes were spent with the patient, with over 50% of the time spent in face to face counseling and coordination of care.     TARA Blakely - CNP    Recipients:

## 2022-02-02 ENCOUNTER — TELEPHONE (OUTPATIENT)
Dept: ONCOLOGY | Age: 67
End: 2022-02-02

## 2022-02-02 NOTE — TELEPHONE ENCOUNTER
Tried calling patient about the Psychology referral that Aida Garzon NP had put in, need for patient to let us know what provider is in her network, had to leave a VM

## 2022-03-10 ENCOUNTER — CLINICAL DOCUMENTATION (OUTPATIENT)
Dept: ONCOLOGY | Age: 67
End: 2022-03-10

## 2022-03-10 NOTE — PROGRESS NOTES
Left message for patient to call about the genetic test results. Left VM. Asked patient to call back for results.

## 2022-04-18 NOTE — PROGRESS NOTES
Patient Name:  Kaylin Elizabeth  Patient :  1955  Patient MRN:  [id number]     Primary Oncologist: Michael Bashir MD  Referring Provider: TARA Serra CNP     Date of Service: 2022      Chief Complaint:    Chief Complaint   Patient presents with   [address]      She came in for follow-up visit. Patient Active Problem List:     Hyperlipemia     HPI:   Reji Welsh is a pleasant 78-year-old female patient was referred for evaluation of left breast cancer. 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  Previous mammogram was in 2016. Diagnostic mammogram with US on 2021 showed at 1 o'clock left breast there was an irregular hypoechoic mass with associated calcifications and posterior acoustic shadowing measuring 1.4.x1.1x1.4 cm. Impression: Highly suspicious mass 1 o'clock left breast and increased calcifications upper-outer quadrant left breast.    She had US guided biopsy on 2021. Pathology report showed invasive ductal carcinoma with focal LCIS at posterior lateral tumor and 1 o'clock tumor. CBC and CMP were unremarkable. CBC and CMP in 2021 were unremarkable, except for mildly elevated alkaline phosphatase at 116. CT chest, abdomen and pelvis on 2021 was negative for metastatic disease. Bone scan in 2021 was negative. She elected to have left breast mastectomy followed by reconstruction. She has left breast mastectomy with LN biopsy on 3/8/2021. Pathology report showed : no residual cancer on mastectomy specimen. Tumor focality: 2 sites. Tumor site: Posterior lateral.  Tumor size: 4 mm. Histologic type: Invasive ductal carcinoma. Histologic grade: Grade 1  ER by IHC was more than 95%, OH by IHC was more than 95%, HER-2/mingo by IHC was 0. Tumor site: 1:00  Tumor size: 5 mm  Histologic type:  Invasive ductal carcinoma  Histologic grade: Grade 1  ER biopsy was more than 95%, OH by IHC was 50%, HER-2/mingo by FISH was not amplified. Margins were clear. No lymphovascular invasion. [place] lymph nodes were negative. Pathological stage T1a, N0, MX. She has bone density in December 2020 by Dr Bobbi Jones and has osteopenia. She was recommended to take vitamin D and calcium. We went over NCCN guideline which recommends to consider adjuvant endocrine therapy. She started Arimidex on March 31, 2021. I recommend yearly mammogram and bone density every 2 years. She has 2 daughters. She is interested in genetic counseling. She has chronic osteoarthritic pain and been taking celebrex. She had positive COVID in 2020 and had COVID vaccine. In August 2021 WBC was 4.9, hemoglobin 10.8, platelet count 887. Ferritin was 12, TIBC 406. Folate was 9.4, B12 477.3. She has been taking oral iron supplement since August 11, 2021. Reportedly Cologuard in January 2021 was negative. She is agreeable to see GI for further evaluation. She stopped taking iron pill in September 2021. She takes ibuprofen as needed. 9/2021 Colonocoscopy :  1) large AVM in the cecum- coagulated with the APC  2) small internal hemorrhoids  3) otherwise normal colon   EGD REPORT:   1) mild pre-pyloric erythema  2) gastric antral and corpal biopsies taken to assay for H pylori by the Olivia-Test method  3) otherwise normal exam   SUGGEST:             1) Colonoscopy in 5 years             2) follow up biopsies    Reportedly CLOtest was negative. She is scheduled for mammogram on January 11, 2022. She will have revision mastectomy on January 14, 2022. On 5/17/2022 she came in for follow up visit. In February 2022 Hg was 11.5, MCV 89.8, plt 365. Ferritin was 16. She was prescribed gabapentin for the hot flashes by the GYN which seems to control it. She has been tolerating Arimidex well. She takes iron pill daily. She will have labs today including iron study. Advised to call for the test result.   She has chronic arthritic pain and takes ibuprofen only as needed. I recommend to try turmeric capsule. Mammogram in January 2022 was benign. I recommend to take vitamin D 2000 to 5000 IU daily and 1200 mg calcium for the osteopenia. She denies any symptoms to suggest recurrent breast cancer. If hemoglobin and/or ferritin dropped, I would offer iron infusion. Denied any nausea, vomiting or diarrhea. No fever or chills. Denied any chest pain, shortness of breath or palpitation. No headache or dizzy spell. No melena or hematochezia. Denied any dysuria or hematuria. Past Medical History:    Depression      GERD (gastroesophageal reflux disease)      Hyperlipidemia      Pap smear for cervical cancer screening 01/08/2009     Neg    Pap smear for cervical cancer screening 01/28/2010     Neg    Pap smear for cervical cancer screening 07/14/2011     Neg    Rectocele      Past Surgery History:     COLONOSCOPY   11/2008    DILATION AND CURETTAGE OF UTERUS   1985    JOINT REPLACEMENT Left 2019     partial knee    PELVIC LAPAROSCOPY   1985    TUBAL LIGATION   1991     Social History:   Tobacco Use    Smoking status: Former Smoker    Smokeless tobacco: Never Used   Substance Use Topics    Alcohol use: Yes    -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 She has her first grandchild who was born in July, 2020 as well    Family History:    Heart Failure Father      Heart Disease Father      High Cholesterol Father      Stroke Mother      Heart Failure Maternal Grandmother      Hypertension Maternal Grandmother      High Blood Pressure Maternal Grandmother      Cancer Paternal Grandmother      Diabetes Paternal Grandmother       Review of Systems: The remainder of the review of system is unremarkable.      Vital Signs: BP (!) 154/93 (Site: Right Upper Arm, Position: Sitting, Cuff Size: Large Adult)   Pulse 100   Temp 97.1 °F (36.2 °C) (Infrared)   Resp 16   Ht 5' 2\" (1.575 m)   Wt 167 lb 3.2 oz (75.8 kg)   SpO2 97%   BMI 30.58 kg/m²      Physical Exam:  CONSTITUTIONAL: awake, alert, cooperative, no apparent distress   EYES: pupils equal, round and reactive to light, sclera clear and conjunctiva pallor   ENT: Normocephalic, without obvious abnormality, atraumatic  NECK: supple, symmetrical, no jugular venous distension and no carotid bruits   HEMATOLOGIC/LYMPHATIC: no cervical, supraclavicular or axillary lymphadenopathy   LUNGS: no increased work of breathing and clear to auscultation   BREAST: s/p left breast mastectomy  CARDIOVASCULAR: regular rate and rhythm, normal S1 and S2, no murmur   ABDOMEN: normal bowel sound, soft, non-distended, non-tender, no masses palpated, no hepatosplenomegaly   MUSCULOSKELETAL: full range of motion noted, tone is normal  NEUROLOGIC: Motor skills grossly intact. CN II - XII grossly intact. SKIN: No jaundice. appears intact. No ecchymosis. EXTREMITIES: no LE edema  or cyanosis.      Labs:  Hematology:  Lab Results   Component Value Date    WBC 5.6 02/01/2022    RBC 3.93 (L) 02/01/2022    HGB 11.5 (L) 02/01/2022    HCT 35.3 (L) 02/01/2022    MCV 89.8 02/01/2022    MCH 29.3 02/01/2022    MCHC 32.6 02/01/2022    RDW 13.4 02/01/2022     02/01/2022    MPV 8.8 02/01/2022    SEGSPCT 59.2 02/01/2022    EOSRELPCT 1.3 02/01/2022    BASOPCT 0.4 02/01/2022    LYMPHOPCT 29.8 02/01/2022    MONOPCT 9.3 (H) 02/01/2022    SEGSABS 3.3 02/01/2022    EOSABS 0.1 02/01/2022    BASOSABS 0.0 02/01/2022    LYMPHSABS 1.7 02/01/2022    MONOSABS 0.5 02/01/2022    DIFFTYPE AUTOMATED DIFFERENTIAL 02/01/2022     Chemistry:  Lab Results   Component Value Date     01/03/2022    K 4.7 01/03/2022     01/03/2022    CO2 25 01/03/2022    BUN 17 01/03/2022    CREATININE 0.7 01/03/2022    GLUCOSE 113 (H) 01/03/2022    CALCIUM 9.9 01/03/2022    PROT 6.9 01/03/2022    LABALBU 4.2 01/03/2022    BILITOT 0.5 01/03/2022    ALKPHOS 111 01/03/2022    AST 21 01/03/2022    ALT 18 01/03/2022    LABGLOM >60 01/03/2022    GFRAA >60 01/03/2022     Immunology:  Lab Results   Component Value Date    PROT 6.9 01/03/2022      Observations:  PHQ-9 Total Score: 0 (5/17/2022 10:06 AM)     Assessment & Plan:  1. She has two small invasive ductal carcinoma of left breast, 4 mm and 5 mm, HR positive and Her-mingo negative s/p mastectomy. She started Arimidex on March 31, 2021. She has been tolerating Arimidex well. Mammogram in January 2022 was benign. I believe she is in remission. She is agreeable to continue with surveillance. 2. She has osteopenia. Bone density was in December 2020. She will take vitamin D and calcium. 3.  Genetic counseling was done. Result was negative. 4. She has osteoarthritis and takes celebrex. She takes ibuprofen as needed. 5.  She has anemia of iron deficiency. She started iron pill on August 11, 2021 and stopped in September 2021. Cologuard in January 2021 was negative. She had upper and lower endoscopic study in September 2021. She had AVM which was coagulated. Labs in February 2022 were reviewed. I will repeat CBC and iron study today. She has been taking daily iron pill. I advised to call for the test result. Hemoglobin and ferritin dropped, I will offer iron infusion. We discussed about healthy diet and life style. She had Covid vaccine. RTC in 16 weeks or sooner. All of her questions have been answered for today.

## 2022-05-09 RX ORDER — ANASTROZOLE 1 MG/1
1 TABLET ORAL DAILY
Qty: 90 TABLET | Refills: 1 | Status: SHIPPED | OUTPATIENT
Start: 2022-05-09 | End: 2022-09-19 | Stop reason: SDUPTHER

## 2022-05-17 ENCOUNTER — HOSPITAL ENCOUNTER (OUTPATIENT)
Dept: INFUSION THERAPY | Age: 67
Discharge: HOME | End: 2022-05-17
Payer: MEDICARE

## 2022-05-17 ENCOUNTER — OFFICE VISIT (OUTPATIENT)
Dept: ONCOLOGY | Age: 67
End: 2022-05-17
Payer: MEDICARE

## 2022-05-17 VITALS
WEIGHT: 167.2 LBS | SYSTOLIC BLOOD PRESSURE: 154 MMHG | HEART RATE: 100 BPM | OXYGEN SATURATION: 97 % | RESPIRATION RATE: 16 BRPM | TEMPERATURE: 97.1 F | BODY MASS INDEX: 30.77 KG/M2 | HEIGHT: 62 IN | DIASTOLIC BLOOD PRESSURE: 93 MMHG

## 2022-05-17 DIAGNOSIS — Z86.2 HISTORY OF IRON DEFICIENCY ANEMIA: ICD-10-CM

## 2022-05-17 DIAGNOSIS — Z86.2 HISTORY OF IRON DEFICIENCY ANEMIA: Primary | ICD-10-CM

## 2022-05-17 LAB
BASOPHILS ABSOLUTE: 0 K/CU MM
BASOPHILS RELATIVE PERCENT: 0.4 % (ref 0–1)
DIFFERENTIAL TYPE: ABNORMAL
EOSINOPHILS ABSOLUTE: 0 K/CU MM
EOSINOPHILS RELATIVE PERCENT: 0.8 % (ref 0–3)
FERRITIN: 18 NG/ML (ref 15–150)
HCT VFR BLD CALC: 35.3 % (ref 37–47)
HEMOGLOBIN: 11.4 GM/DL (ref 12.5–16)
IRON: 70 UG/DL (ref 37–145)
LYMPHOCYTES ABSOLUTE: 1.7 K/CU MM
LYMPHOCYTES RELATIVE PERCENT: 32.2 % (ref 24–44)
MCH RBC QN AUTO: 28.9 PG (ref 27–31)
MCHC RBC AUTO-ENTMCNC: 32.3 % (ref 32–36)
MCV RBC AUTO: 89.4 FL (ref 78–100)
MONOCYTES ABSOLUTE: 0.5 K/CU MM
MONOCYTES RELATIVE PERCENT: 9.4 % (ref 0–4)
PCT TRANSFERRIN: 19 % (ref 10–44)
PDW BLD-RTO: 14.9 % (ref 11.7–14.9)
PLATELET # BLD: 289 K/CU MM (ref 140–440)
PMV BLD AUTO: 8.8 FL (ref 7.5–11.1)
RBC # BLD: 3.95 M/CU MM (ref 4.2–5.4)
SEGMENTED NEUTROPHILS ABSOLUTE COUNT: 3 K/CU MM
SEGMENTED NEUTROPHILS RELATIVE PERCENT: 57.2 % (ref 36–66)
TOTAL IRON BINDING CAPACITY: 377 UG/DL (ref 250–450)
UNSATURATED IRON BINDING CAPACITY: 307 UG/DL (ref 110–370)
WBC # BLD: 5.2 K/CU MM (ref 4–10.5)

## 2022-05-17 PROCEDURE — 85025 COMPLETE CBC W/AUTO DIFF WBC: CPT

## 2022-05-17 PROCEDURE — 1123F ACP DISCUSS/DSCN MKR DOCD: CPT | Performed by: INTERNAL MEDICINE

## 2022-05-17 PROCEDURE — G8417 CALC BMI ABV UP PARAM F/U: HCPCS | Performed by: INTERNAL MEDICINE

## 2022-05-17 PROCEDURE — 36415 COLL VENOUS BLD VENIPUNCTURE: CPT

## 2022-05-17 PROCEDURE — 4040F PNEUMOC VAC/ADMIN/RCVD: CPT | Performed by: INTERNAL MEDICINE

## 2022-05-17 PROCEDURE — 3017F COLORECTAL CA SCREEN DOC REV: CPT | Performed by: INTERNAL MEDICINE

## 2022-05-17 PROCEDURE — G8400 PT W/DXA NO RESULTS DOC: HCPCS | Performed by: INTERNAL MEDICINE

## 2022-05-17 PROCEDURE — 1036F TOBACCO NON-USER: CPT | Performed by: INTERNAL MEDICINE

## 2022-05-17 PROCEDURE — 99213 OFFICE O/P EST LOW 20 MIN: CPT | Performed by: INTERNAL MEDICINE

## 2022-05-17 PROCEDURE — 83550 IRON BINDING TEST: CPT

## 2022-05-17 PROCEDURE — G8427 DOCREV CUR MEDS BY ELIG CLIN: HCPCS | Performed by: INTERNAL MEDICINE

## 2022-05-17 PROCEDURE — 82728 ASSAY OF FERRITIN: CPT

## 2022-05-17 PROCEDURE — 83540 ASSAY OF IRON: CPT

## 2022-05-17 PROCEDURE — 1090F PRES/ABSN URINE INCON ASSESS: CPT | Performed by: INTERNAL MEDICINE

## 2022-05-17 ASSESSMENT — PATIENT HEALTH QUESTIONNAIRE - PHQ9
2. FEELING DOWN, DEPRESSED OR HOPELESS: 0
SUM OF ALL RESPONSES TO PHQ QUESTIONS 1-9: 0
SUM OF ALL RESPONSES TO PHQ QUESTIONS 1-9: 0
SUM OF ALL RESPONSES TO PHQ9 QUESTIONS 1 & 2: 0
SUM OF ALL RESPONSES TO PHQ QUESTIONS 1-9: 0
SUM OF ALL RESPONSES TO PHQ QUESTIONS 1-9: 0
1. LITTLE INTEREST OR PLEASURE IN DOING THINGS: 0

## 2022-05-17 NOTE — PROGRESS NOTES
MA Rooming Questions  Patient: July Bee  MRN: [id number]    Date: 5/17/2022        1. Do you have any new issues?   no         2. Do you need any refills on medications?    no    3. Have you had any imaging done since your last visit?   no    4. Have you been hospitalized or seen in the emergency room since your last visit here?   no    5. Did the patient have a depression screening completed today?  Yes    PHQ-9 Total Score: 0 (5/17/2022 10:06 AM)       PHQ-9 Given to (if applicable):               PHQ-9 Score (if applicable):                     [] Positive     []  Negative              Does question #9 need addressed (if applicable)                     [] Yes    []  No               King Sevilla CMA

## 2022-08-24 NOTE — PROGRESS NOTES
Patient Name:  Yajaira Li  Patient :  1955  Patient MRN:  [id number]     Primary Oncologist: Lacey Spain MD  Referring Provider: TARA Garcia CNP     Date of Service: 2022      Chief Complaint:    No chief complaint on file. She came in for follow-up visit. Patient Active Problem List:     Hyperlipemia     Breast cancer     HPI:   Keith Choi is a pleasant 81-year-old female patient was referred for evaluation of left breast cancer. 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  Previous mammogram was in 2016. Diagnostic mammogram with US on 2021 showed at 1 o'clock left breast there was an irregular hypoechoic mass with associated calcifications and posterior acoustic shadowing measuring 1.4.x1.1x1.4 cm. Impression: Highly suspicious mass 1 o'clock left breast and increased calcifications upper-outer quadrant left breast.    She had US guided biopsy on 2021. Pathology report showed invasive ductal carcinoma with focal LCIS at posterior lateral tumor and 1 o'clock tumor. CBC and CMP were unremarkable. CBC and CMP in 2021 were unremarkable, except for mildly elevated alkaline phosphatase at 116. CT chest, abdomen and pelvis on 2021 was negative for metastatic disease. Bone scan in 2021 was negative. She elected to have left breast mastectomy followed by reconstruction. She has left breast mastectomy with LN biopsy on 3/8/2021. Pathology report showed : no residual cancer on mastectomy specimen. Tumor focality: 2 sites. Tumor site: Posterior lateral.  Tumor size: 4 mm. Histologic type: Invasive ductal carcinoma. Histologic grade: Grade 1  ER by IHC was more than 95%, DC by IHC was more than 95%, HER-2/mingo by IHC was 0. Tumor site: 1:00  Tumor size: 5 mm  Histologic type:  Invasive ductal carcinoma  Histologic grade: Grade 1  ER biopsy was more than 95%, DC by IHC was 50%, HER-2/mingo by [facility] was not amplified. Margins were clear. No lymphovascular invasion. [place] lymph nodes were negative. Stage pT1a, N0, MX. Bone density in December 2020 by Dr Noa Tucker : osteopenia. She was recommended to take vitamin D and calcium. We went over NCCN guideline which recommends to consider adjuvant endocrine therapy. She started Arimidex on March 31, 2021. I recommend yearly mammogram and bone density every 2 years. She has 2 daughters. She is interested in genetic counseling. She has chronic osteoarthritic pain and been taking celebrex. She had positive COVID in 2020 and had COVID vaccine. In August 2021 WBC was 4.9, hemoglobin 10.8, platelet count 032. Ferritin was 12, TIBC 406. Folate was 9.4, B12 477.3. She has been taking oral iron supplement since August 11, 2021. Reportedly Cologuard in January 2021 was negative. She is agreeable to see GI for further evaluation. She stopped taking iron pill in September 2021. She takes ibuprofen as needed. 9/2021 Colonocoscopy :  1) large AVM in the cecum- coagulated with the APC  2) small internal hemorrhoids  3) otherwise normal colon   EGD REPORT:   1) mild pre-pyloric erythema  2) gastric antral and corpal biopsies taken to assay for H pylori by the Olivia-Test method  3) otherwise normal exam   SUGGEST:             1) Colonoscopy in 5 years             2) follow up biopsies    Reportedly CLOtest was negative. She is scheduled for mammogram on January 11, 2022. In February 2022 Hg was 11.5, MCV 89.8, plt 365. Ferritin was 16. She was prescribed gabapentin for the hot flashes by the GYN which seems to control it. She has been tolerating Arimidex well. She takes iron pill daily. She has chronic arthritic pain and takes ibuprofen only as needed. I recommend to try turmeric capsule. Mammogram in January 2022 was benign. I recommend to take vitamin D 2000 to 5000 IU daily and 1200 mg calcium for the osteopenia.     9/19/2022 she came in for follow up visit. 9/2022 Hg 12.1, MCV 90.8. Ferritin 16, TIBC 377. Iron 56, saturation 15%. I recommend monthly self breast exam.  She works part-time. She will continue with oral iron supplement. She wants to defer iron infusion. We will repeat iron study and CBC prior to next office visit. She denied any symptoms to suggest recurrent breast cancer. If hemoglobin and/or ferritin dropped, I would offer iron infusion. Denied any nausea, vomiting or diarrhea. No fever or chills. Denied any chest pain, shortness of breath or palpitation. No headache or dizzy spell. No melena or hematochezia. Denied any dysuria or hematuria. Past Medical History:   Diagnosis Date    Arthritis     Cancer ([address] 01/2021    L breast     Depression     GERD (gastroesophageal reflux disease)     History of blood transfusion     Hyperlipidemia     Pap smear for cervical cancer screening 01/08/2009    Neg    Pap smear for cervical cancer screening 01/28/2010    Neg    Pap smear for cervical cancer screening 07/14/2011    Neg    Rectocele      Past Surgical History:   Procedure Laterality Date    BREAST BIOPSY      COLONOSCOPY  11/2008    COLONOSCOPY N/A 9/29/2021    COLONOSCOPY CONTROL HEMORRHAGE performed by Vel Carbajal MD at [address] [place] [address] Left 2019    partial knee    KNEE SURGERY      MASTECTOMY, PARTIAL Left 03/08/2021    PELVIC LAPAROSCOPY  1985    TUBAL LIGATION  1991    UPPER GASTROINTESTINAL ENDOSCOPY N/A 9/29/2021    EGD BIOPSY performed by Vel Carbajal MD at [address] [place] [address],[address] History:   Tobacco Use    Smoking status: Former Smoker    Smokeless tobacco: Never Used   Substance Use Topics    Alcohol use: Yes    -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  She has her first grandchild who was born in July, 2020 as well    Family History:    Heart Failure Father      Heart Disease Father      High Cholesterol Father      Stroke Mother      Heart Failure Maternal CO2 25 01/03/2022    BUN 17 01/03/2022    CREATININE 0.7 01/03/2022    GLUCOSE 113 (H) 01/03/2022    CALCIUM 9.9 01/03/2022    PROT 6.9 01/03/2022    LABALBU 4.2 01/03/2022    BILITOT 0.5 01/03/2022    ALKPHOS 111 01/03/2022    AST 21 01/03/2022    ALT 18 01/03/2022    LABGLOM >60 01/03/2022    GFRAA >60 01/03/2022     Immunology:  Lab Results   Component Value Date    PROT 6.9 01/03/2022      Observations:  No data recorded     Assessment & Plan:  1. She has two small invasive ductal carcinoma of left breast, 4 mm and 5 mm, HR positive and Her-mingo negative s/p mastectomy. She started Arimidex on March 31, 2021. She has been tolerating Arimidex well. Mammogram in January 2022 was benign. She denied any symptoms of recurrent breast cancer. I recommend monthly self breast exam.    2. She has osteopenia. Bone density was in December 2020. She will take vitamin D and calcium. 3. Genetic counseling was done. Result was negative. 4. She has osteoarthritis. She takes ibuprofen and tylenol as needed. 5.  She has anemia of iron deficiency. She started iron pill on August 11, 2021 and stopped in September 2021. Cologuard in January 2021 was negative. She had upper and lower endoscopic study in September 2021. She had AVM which was coagulated. Labs in February 2022 were reviewed. Recent labs in September 2022 reviewed. She will continue with oral iron supplement. She wants to defer iron infusion. I will repeat iron study and CBC prior to next office visit. We discussed about healthy diet and life style. She had Covid vaccine. RTC in 16 weeks or sooner. All of her questions have been answered for today.

## 2022-09-13 ENCOUNTER — HOSPITAL ENCOUNTER (OUTPATIENT)
Dept: INFUSION THERAPY | Age: 67
Discharge: HOME | End: 2022-09-13
Payer: MEDICARE

## 2022-09-13 DIAGNOSIS — Z17.0 MALIGNANT NEOPLASM OF UPPER-OUTER QUADRANT OF LEFT BREAST IN FEMALE, ESTROGEN RECEPTOR POSITIVE (HCC): ICD-10-CM

## 2022-09-13 DIAGNOSIS — C50.412 MALIGNANT NEOPLASM OF UPPER-OUTER QUADRANT OF LEFT BREAST IN FEMALE, ESTROGEN RECEPTOR POSITIVE (HCC): ICD-10-CM

## 2022-09-13 DIAGNOSIS — Z86.2 HISTORY OF IRON DEFICIENCY ANEMIA: ICD-10-CM

## 2022-09-13 LAB
BASOPHILS ABSOLUTE: 0 K/CU MM
BASOPHILS RELATIVE PERCENT: 0.3 % (ref 0–1)
DIFFERENTIAL TYPE: ABNORMAL
EOSINOPHILS ABSOLUTE: 0.1 K/CU MM
EOSINOPHILS RELATIVE PERCENT: 1.2 % (ref 0–3)
FERRITIN: 16 NG/ML (ref 15–150)
HCT VFR BLD CALC: 37.3 % (ref 37–47)
HEMOGLOBIN: 12.1 GM/DL (ref 12.5–16)
IRON: 56 UG/DL (ref 37–145)
LYMPHOCYTES ABSOLUTE: 1.7 K/CU MM
LYMPHOCYTES RELATIVE PERCENT: 28.3 % (ref 24–44)
MCH RBC QN AUTO: 29.4 PG (ref 27–31)
MCHC RBC AUTO-ENTMCNC: 32.4 % (ref 32–36)
MCV RBC AUTO: 90.8 FL (ref 78–100)
MONOCYTES ABSOLUTE: 0.5 K/CU MM
MONOCYTES RELATIVE PERCENT: 8.4 % (ref 0–4)
PCT TRANSFERRIN: 15 % (ref 10–44)
PDW BLD-RTO: 14 % (ref 11.7–14.9)
PLATELET # BLD: 282 K/CU MM (ref 140–440)
PMV BLD AUTO: 8.7 FL (ref 7.5–11.1)
RBC # BLD: 4.11 M/CU MM (ref 4.2–5.4)
SEGMENTED NEUTROPHILS ABSOLUTE COUNT: 3.7 K/CU MM
SEGMENTED NEUTROPHILS RELATIVE PERCENT: 61.8 % (ref 36–66)
TOTAL IRON BINDING CAPACITY: 377 UG/DL (ref 250–450)
UNSATURATED IRON BINDING CAPACITY: 321 UG/DL (ref 110–370)
WBC # BLD: 6 K/CU MM (ref 4–10.5)

## 2022-09-13 PROCEDURE — 83540 ASSAY OF IRON: CPT

## 2022-09-13 PROCEDURE — 82728 ASSAY OF FERRITIN: CPT

## 2022-09-13 PROCEDURE — 83550 IRON BINDING TEST: CPT

## 2022-09-13 PROCEDURE — 85025 COMPLETE CBC W/AUTO DIFF WBC: CPT

## 2022-09-13 PROCEDURE — 36415 COLL VENOUS BLD VENIPUNCTURE: CPT

## 2022-09-19 ENCOUNTER — HOSPITAL ENCOUNTER (OUTPATIENT)
Dept: INFUSION THERAPY | Age: 67
Discharge: HOME | End: 2022-09-19
Payer: MEDICARE

## 2022-09-19 ENCOUNTER — OFFICE VISIT (OUTPATIENT)
Dept: ONCOLOGY | Age: 67
End: 2022-09-19
Payer: MEDICARE

## 2022-09-19 VITALS
RESPIRATION RATE: 18 BRPM | TEMPERATURE: 98.2 F | DIASTOLIC BLOOD PRESSURE: 75 MMHG | BODY MASS INDEX: 31.32 KG/M2 | HEIGHT: 62 IN | OXYGEN SATURATION: 96 % | WEIGHT: 170.2 LBS | SYSTOLIC BLOOD PRESSURE: 135 MMHG | HEART RATE: 107 BPM

## 2022-09-19 DIAGNOSIS — Z78.0 MENOPAUSE: Primary | ICD-10-CM

## 2022-09-19 DIAGNOSIS — C50.412 MALIGNANT NEOPLASM OF UPPER-OUTER QUADRANT OF LEFT BREAST IN FEMALE, ESTROGEN RECEPTOR POSITIVE (HCC): ICD-10-CM

## 2022-09-19 DIAGNOSIS — Z86.2 HISTORY OF IRON DEFICIENCY ANEMIA: ICD-10-CM

## 2022-09-19 DIAGNOSIS — Z17.0 MALIGNANT NEOPLASM OF UPPER-OUTER QUADRANT OF LEFT BREAST IN FEMALE, ESTROGEN RECEPTOR POSITIVE (HCC): ICD-10-CM

## 2022-09-19 PROCEDURE — 1123F ACP DISCUSS/DSCN MKR DOCD: CPT | Performed by: INTERNAL MEDICINE

## 2022-09-19 PROCEDURE — 99213 OFFICE O/P EST LOW 20 MIN: CPT | Performed by: INTERNAL MEDICINE

## 2022-09-19 PROCEDURE — 99211 OFF/OP EST MAY X REQ PHY/QHP: CPT

## 2022-09-19 PROCEDURE — 1090F PRES/ABSN URINE INCON ASSESS: CPT | Performed by: INTERNAL MEDICINE

## 2022-09-19 PROCEDURE — 1036F TOBACCO NON-USER: CPT | Performed by: INTERNAL MEDICINE

## 2022-09-19 PROCEDURE — G8400 PT W/DXA NO RESULTS DOC: HCPCS | Performed by: INTERNAL MEDICINE

## 2022-09-19 PROCEDURE — 3017F COLORECTAL CA SCREEN DOC REV: CPT | Performed by: INTERNAL MEDICINE

## 2022-09-19 PROCEDURE — G8417 CALC BMI ABV UP PARAM F/U: HCPCS | Performed by: INTERNAL MEDICINE

## 2022-09-19 PROCEDURE — G8427 DOCREV CUR MEDS BY ELIG CLIN: HCPCS | Performed by: INTERNAL MEDICINE

## 2022-09-19 RX ORDER — TRAZODONE HYDROCHLORIDE 50 MG/1
TABLET ORAL
COMMUNITY
Start: 2022-07-27 | End: 2022-09-19

## 2022-09-19 RX ORDER — ATORVASTATIN CALCIUM 80 MG/1
TABLET, FILM COATED ORAL
COMMUNITY
Start: 2022-07-27

## 2022-09-19 RX ORDER — ANASTROZOLE 1 MG/1
1 TABLET ORAL DAILY
Qty: 90 TABLET | Refills: 1 | Status: SHIPPED | OUTPATIENT
Start: 2022-09-19

## 2022-09-19 NOTE — PROGRESS NOTES
MA Rooming Questions  Patient: Milly Sullivan  MRN: [id number]    Date: 9/19/2022        1. Do you have any new issues?   no         2. Do you need any refills on medications? yes - Arimidex    3. Have you had any imaging done since your last visit?   no    4. Have you been hospitalized or seen in the emergency room since your last visit here?   no    5. Did the patient have a depression screening completed today?  No    No data recorded     PHQ-9 Given to (if applicable):               PHQ-9 Score (if applicable):                     [] Positive     []  Negative              Does question #9 need addressed (if applicable)                     [] Yes    []  No               Alem Jasso CMA

## 2022-09-30 ENCOUNTER — TELEPHONE (OUTPATIENT)
Dept: ONCOLOGY | Age: 67
End: 2022-09-30

## 2022-09-30 NOTE — TELEPHONE ENCOUNTER
Called patient advising them of their DEXA, patient is scheduled for 12/19 at [facility] facility @ 21  arrival for 1030 test, no supplements hrs prior to test, no metal to test. Left number [phone] for questions or concerns.

## 2022-11-30 ENCOUNTER — OFFICE VISIT (OUTPATIENT)
Dept: OBGYN | Age: 67
End: 2022-11-30
Payer: MEDICARE

## 2022-11-30 VITALS
HEIGHT: 62 IN | SYSTOLIC BLOOD PRESSURE: 151 MMHG | WEIGHT: 170 LBS | DIASTOLIC BLOOD PRESSURE: 72 MMHG | BODY MASS INDEX: 31.28 KG/M2

## 2022-11-30 DIAGNOSIS — Z78.0 ENCOUNTER FOR OSTEOPOROSIS SCREENING IN ASYMPTOMATIC POSTMENOPAUSAL PATIENT: Primary | ICD-10-CM

## 2022-11-30 DIAGNOSIS — Z13.820 ENCOUNTER FOR OSTEOPOROSIS SCREENING IN ASYMPTOMATIC POSTMENOPAUSAL PATIENT: Primary | ICD-10-CM

## 2022-11-30 DIAGNOSIS — R61 NIGHT SWEATS: ICD-10-CM

## 2022-11-30 DIAGNOSIS — M85.80 OSTEOPENIA, UNSPECIFIED LOCATION: ICD-10-CM

## 2022-11-30 PROCEDURE — 1090F PRES/ABSN URINE INCON ASSESS: CPT | Performed by: NURSE PRACTITIONER

## 2022-11-30 PROCEDURE — 1123F ACP DISCUSS/DSCN MKR DOCD: CPT | Performed by: NURSE PRACTITIONER

## 2022-11-30 PROCEDURE — 3017F COLORECTAL CA SCREEN DOC REV: CPT | Performed by: NURSE PRACTITIONER

## 2022-11-30 PROCEDURE — G8400 PT W/DXA NO RESULTS DOC: HCPCS | Performed by: NURSE PRACTITIONER

## 2022-11-30 PROCEDURE — G8427 DOCREV CUR MEDS BY ELIG CLIN: HCPCS | Performed by: NURSE PRACTITIONER

## 2022-11-30 PROCEDURE — G8484 FLU IMMUNIZE NO ADMIN: HCPCS | Performed by: NURSE PRACTITIONER

## 2022-11-30 PROCEDURE — G8417 CALC BMI ABV UP PARAM F/U: HCPCS | Performed by: NURSE PRACTITIONER

## 2022-11-30 PROCEDURE — 1036F TOBACCO NON-USER: CPT | Performed by: NURSE PRACTITIONER

## 2022-11-30 PROCEDURE — 99213 OFFICE O/P EST LOW 20 MIN: CPT | Performed by: NURSE PRACTITIONER

## 2022-11-30 RX ORDER — GABAPENTIN 300 MG/1
300 CAPSULE ORAL NIGHTLY
Qty: 90 CAPSULE | Refills: 3 | Status: SHIPPED | OUTPATIENT
Start: 2022-11-30 | End: 2023-05-29

## 2022-11-30 SDOH — ECONOMIC STABILITY: FOOD INSECURITY: WITHIN THE PAST 12 MONTHS, THE FOOD YOU BOUGHT JUST DIDN'T LAST AND YOU DIDN'T HAVE MONEY TO GET MORE.: NEVER TRUE

## 2022-11-30 SDOH — ECONOMIC STABILITY: TRANSPORTATION INSECURITY
IN THE PAST 12 MONTHS, HAS THE LACK OF TRANSPORTATION KEPT YOU FROM MEDICAL APPOINTMENTS OR FROM GETTING MEDICATIONS?: NO

## 2022-11-30 SDOH — ECONOMIC STABILITY: FOOD INSECURITY: WITHIN THE PAST 12 MONTHS, YOU WORRIED THAT YOUR FOOD WOULD RUN OUT BEFORE YOU GOT MONEY TO BUY MORE.: NEVER TRUE

## 2022-11-30 SDOH — ECONOMIC STABILITY: TRANSPORTATION INSECURITY
IN THE PAST 12 MONTHS, HAS LACK OF TRANSPORTATION KEPT YOU FROM MEETINGS, WORK, OR FROM GETTING THINGS NEEDED FOR DAILY LIVING?: NO

## 2022-11-30 ASSESSMENT — ENCOUNTER SYMPTOMS
CONSTIPATION: 0
DIARRHEA: 0
RESPIRATORY NEGATIVE: 1
VOMITING: 0
ABDOMINAL PAIN: 0
GASTROINTESTINAL NEGATIVE: 1
SHORTNESS OF BREATH: 0
NAUSEA: 0

## 2022-11-30 ASSESSMENT — SOCIAL DETERMINANTS OF HEALTH (SDOH): HOW HARD IS IT FOR YOU TO PAY FOR THE VERY BASICS LIKE FOOD, HOUSING, MEDICAL CARE, AND HEATING?: NOT HARD AT ALL

## 2022-11-30 NOTE — PROGRESS NOTES
11/30/22    Jake uSarez  1955    Chief Complaint   Patient presents with    Other     Pt here for medication refill for gabapentin for hot flashes. She has medicare. Jake Suarez is a 78 y.o. female who presents today for RF of Gabapentin 300mg QHS and Dexa Scan order.     Pt seeing Dr Hansa Reyes for additional breast images; R breast Fibroadenoma     Past Medical History:   Diagnosis Date    Arthritis     Cancer ([address] 01/2021    L breast     Depression     GERD (gastroesophageal reflux disease)     History of blood transfusion     Hyperlipidemia     Pap smear for cervical cancer screening 01/08/2009    Neg    Pap smear for cervical cancer screening 01/28/2010    Neg    Pap smear for cervical cancer screening 07/14/2011    Neg    Rectocele        Past Surgical History:   Procedure Laterality Date    BREAST BIOPSY      COLONOSCOPY  11/2008    COLONOSCOPY N/A 9/29/2021    COLONOSCOPY CONTROL HEMORRHAGE performed by Marnie Conrad MD at [address] [place] [address] Left 2019    partial knee    KNEE SURGERY      MASTECTOMY, PARTIAL Left 03/08/2021    PELVIC LAPAROSCOPY  1985    TUBAL LIGATION  1991    UPPER GASTROINTESTINAL ENDOSCOPY N/A 9/29/2021    EGD BIOPSY performed by Marnie Conrad MD at [facility] History     Tobacco Use    Smoking status: Never    Smokeless tobacco: Never   Substance Use Topics    Alcohol use: Yes    Drug use: No       Family History   Problem Relation Age of Onset    Heart Failure Father     Heart Disease Father     High Cholesterol Father     Coronary Art Dis Father     Arthritis Father     Stroke Mother     Heart Failure Maternal Grandmother     Hypertension Maternal Grandmother     High Blood Pressure Maternal Grandmother     Cancer Paternal Grandmother     Diabetes Paternal Grandmother     Arthritis Paternal Aunt        Current Outpatient Medications   Medication Sig Dispense Refill    metFORMIN (GLUCOPHAGE) 500 MG tablet TAKE 1 TABLET BY MOUTH EVERY DAY      atorvastatin (LIPITOR) 80 MG tablet TAKE 1 TABLET BY MOUTH EVERY DAY      anastrozole (ARIMIDEX) 1 MG tablet Take 1 tablet by mouth daily 90 tablet 1    ferrous sulfate (IRON 325) 325 (65 Fe) MG tablet Take 325 mg by mouth daily (with breakfast)       omeprazole (PRILOSEC) 40 MG delayed release capsule TAKE 1 CAPSULE BY MOUTH DAILY      FLUoxetine (PROZAC) 20 MG capsule        No current facility-administered medications for this visit. No Known Allergies        Immunization History   Administered Date(s) Administered    COVID-19, PFIZER Bivalent BOOSTER, (age 12y+), IM, 30 mcg/0.3 mL dose 2022    COVID-19, PFIZER GRAY top, DO NOT Dilute, (age 15 y+), IM, 30 mcg/0.3 mL 2022    COVID-19, PFIZER PURPLE top, DILUTE for use, (age 15 y+), 30mcg/0.3mL 2021, 2021, 2021    Influenza A (V9P6-45) Vaccine PF IM 2009    Influenza, FLUARIX, FLULAVAL, FLUZONE (age 10 mo+) AND AFLURIA, (age 1 y+), PF, 0.5mL 10/24/2019    Influenza, FLUZONE (age 72 y+), High Dose, 0.7mL 2021    Pneumococcal Conjugate 13-valent (Geremias Haver) 2021       Review of Systems   Constitutional: Negative. Negative for fatigue. Respiratory: Negative. Negative for shortness of breath. Gastrointestinal: Negative. Negative for abdominal pain, constipation, diarrhea, nausea and vomiting. Genitourinary: Negative. Neurological:  Negative for dizziness. Psychiatric/Behavioral: Negative. BP (!) 151/72   Ht 5' 2\" (1.575 m)   Wt 170 lb (77.1 kg)   BMI 31.09 kg/m²     Physical Exam  Vitals and nursing note reviewed. Constitutional:       Appearance: Normal appearance. She is obese. HENT:      Head: Normocephalic. Pulmonary:      Effort: Pulmonary effort is normal.   Musculoskeletal:         General: Normal range of motion. Cervical back: Normal range of motion. Skin:     General: Skin is warm and dry.    Neurological:      Mental Status: She is alert. Psychiatric:         Mood and Affect: Mood normal.       No results found for this visit on 11/30/22. ASSESSMENT AND PLAN   Diagnosis Orders   1. Encounter for osteoporosis screening in asymptomatic postmenopausal patient          Pt continues to see improvement in night sweats with Gabapentin 300mg QHS    No follow-ups on file.     Toyin Choi, TARA - CNP

## 2022-12-05 RX ORDER — GABAPENTIN 300 MG/1
CAPSULE ORAL
Qty: 30 CAPSULE | OUTPATIENT
Start: 2022-12-05

## 2022-12-20 NOTE — PROGRESS NOTES
Patient Name:  Erich uRiz  Patient :  1955  Patient MRN:  [id number]     Primary Oncologist: Azeem Peterson MD  Referring Provider: TARA Granger CNP     Date of Service: 2023      Chief Complaint:    No chief complaint on file. She came in for follow-up visit. Patient Active Problem List:     Hyperlipemia     Breast cancer     HPI:   Lynne Mccarthy is a pleasant 69-year-old female patient was referred for evaluation of left breast cancer. 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  Previous mammogram was in 2016. Diagnostic mammogram with US on 2021 showed at 1 o'clock left breast there was an irregular hypoechoic mass with associated calcifications and posterior acoustic shadowing measuring 1.4.x1.1x1.4 cm. Impression: Highly suspicious mass 1 o'clock left breast and increased calcifications upper-outer quadrant left breast.    She had US guided biopsy on 2021. Pathology report showed invasive ductal carcinoma with focal LCIS at posterior lateral tumor and 1 o'clock tumor. CBC and CMP were unremarkable. CBC and CMP in 2021 were unremarkable, except for mildly elevated alkaline phosphatase at 116. CT chest, abdomen and pelvis on 2021 was negative for metastatic disease. Bone scan in 2021 was negative. She elected to have left breast mastectomy followed by reconstruction. She has left breast mastectomy with LN biopsy on 3/8/2021. Pathology report showed : no residual cancer on mastectomy specimen. Tumor focality: 2 sites. Tumor site: Posterior lateral.  Tumor size: 4 mm. Histologic type: Invasive ductal carcinoma. Histologic grade: Grade 1  ER by IHC was more than 95%, NH by IHC was more than 95%, HER-2/mingo by IHC was 0. Tumor site: 1:00  Tumor size: 5 mm  Histologic type:  Invasive ductal carcinoma  Histologic grade: Grade 1  ER biopsy was more than 95%, NH by IHC was 50%, HER-2/mingo by [facility] was not amplified. Margins were clear. No lymphovascular invasion. Clark lymph nodes were negative. Stage pT1a, N0, MX.    12/2022 bone density by Dr Medina Case : osteopenia. She was recommended to take vitamin D and calcium. We went over NCCN guideline which recommends to consider adjuvant endocrine therapy. March 31, 2021 she started Arimidex. I recommend yearly mammogram and bone density every 2 years. She has 2 daughters. She is interested in genetic counseling. She has chronic osteoarthritic pain and been taking celebrex. She had positive COVID in 2020 and had COVID vaccine. August 2021 WBC was 4.9, hemoglobin 10.8, platelet count 582. Ferritin was 12, TIBC 406. Folate was 9.4, B12 477.3. She has been taking oral iron supplement since August 11, 2021. Reportedly Cologuard in January 2021 was negative. She is agreeable to see GI for further evaluation. She stopped taking iron pill in September 2021. She takes ibuprofen as needed. 9/2021 Colonocoscopy :  1) large AVM in the cecum- coagulated with the APC  2) small internal hemorrhoids  3) otherwise normal colon   EGD REPORT:   1) mild pre-pyloric erythema  2) gastric antral and corpal biopsies taken to assay for H pylori by the Olivia-Test method  3) otherwise normal exam   SUGGEST:             1) Colonoscopy in 5 years             2) follow up biopsies    Reportedly CLOtest was negative. She is scheduled for mammogram on January 11, 2022. In February 2022 Hg was 11.5, MCV 89.8, plt 365. Ferritin was 16. She was prescribed gabapentin for the hot flashes by the GYN which seems to control it. She has been tolerating Arimidex well. She takes iron pill daily. She has chronic arthritic pain and takes ibuprofen only as needed. I recommend to try turmeric capsule. Mammogram in January 2022 was benign. I recommend to take vitamin D 2000 to 5000 IU daily and 1200 mg calcium for the osteopenia. 9/2022 Hg 12.1, MCV 90.8.  Ferritin 16, TIBC 377. Iron 56, saturation 15%. 1/19/2023 she came in for follow up visit. 1/12/2023 CMP grossly unremarkable with random . Hg 12.1, MCV 90.7. Ferritin 23, TIBC 378.  12/2/2022 DEXA right hip osteoporosis. She will start boniva prescribed by PCP. I recommend to take vitamin D 2000 IU and 1200 mg calcium daily. I recommend monthly self breast exam. She will continue with oral iron supplement, slow fe. She wants to defer iron infusion. We will repeat iron study and CBC prior to next office visit. She takes gabapentin QHS for hot flashes. She denied any symptoms of recurrent breast cancer. 12/23/2022 MRI of breast: probably benign. F/u in 6 months. Denied any nausea, vomiting or diarrhea. No fever or chills. Denied any chest pain, shortness of breath or palpitation. No headache or dizzy spell. No melena or hematochezia. Denied any dysuria or hematuria. Past Medical History:   Diagnosis Date    Arthritis     Cancer ([address] 01/2021    L breast     Depression     GERD (gastroesophageal reflux disease)     History of blood transfusion     Hyperlipidemia     Pap smear for cervical cancer screening 01/08/2009    Neg    Pap smear for cervical cancer screening 01/28/2010    Neg    Pap smear for cervical cancer screening 07/14/2011    Neg    Rectocele      Past Surgical History:   Procedure Laterality Date    BREAST BIOPSY      COLONOSCOPY  11/2008    COLONOSCOPY N/A 9/29/2021    COLONOSCOPY CONTROL HEMORRHAGE performed by Jonathan Kauffman MD at [address] [place] [address] Left 2019    partial knee    KNEE SURGERY      MASTECTOMY, PARTIAL Left 03/08/2021    PELVIC LAPAROSCOPY  1985    TUBAL LIGATION  1991    UPPER GASTROINTESTINAL ENDOSCOPY N/A 9/29/2021    EGD BIOPSY performed by Jonathan Kauffman MD at [address] [place] [address],[address] History:   Tobacco Use    Smoking status: Former Smoker    Smokeless tobacco: Never Used   Substance Use Topics    Alcohol use:  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 She has her first grandchild who was born in July, 2020 as well    Family History:    Heart Failure Father      Heart Disease Father      High Cholesterol Father      Stroke Mother      Heart Failure Maternal Grandmother      Hypertension Maternal Grandmother      High Blood Pressure Maternal Grandmother      Cancer Paternal Grandmother      Diabetes Paternal Grandmother       Review of Systems: The remainder of the review of system is unremarkable. Vital Signs: There were no vitals taken for this visit. Physical Exam:  CONSTITUTIONAL: awake, alert, cooperative, no apparent distress   EYES: pupils equal, round and reactive to light. Sclera clear and + pallor   ENT: Normocephalic, without obvious abnormality, atraumatic  NECK: supple, symmetrical, no jugular venous distension and no carotid bruits   HEMATOLOGIC/LYMPHATIC: no cervical, supraclavicular or axillary lymphadenopathy   LUNGS: no increased work of breathing and clear to auscultation   BREAST: s/p left breast mastectomy  CARDIOVASCULAR: regular rate and rhythm, normal S1 and S2, no murmur   ABDOMEN: normal bowel sound, soft, non-distended, non-tender, no masses palpated, no rebound or  guarding. MUSCULOSKELETAL: full range of motion noted, tone is normal  NEUROLOGIC: Motor skills grossly intact. CN II - XII grossly intact. SKIN: No jaundice. appears intact. No ecchymosis. EXTREMITIES: no LE edema  or cyanosis. Homans negative.      Labs:  Hematology:  Lab Results   Component Value Date    WBC 6.0 09/13/2022    RBC 4.11 (L) 09/13/2022    HGB 12.1 (L) 09/13/2022    HCT 37.3 09/13/2022    MCV 90.8 09/13/2022    MCH 29.4 09/13/2022    MCHC 32.4 09/13/2022    RDW 14.0 09/13/2022     09/13/2022    MPV 8.7 09/13/2022    SEGSPCT 61.8 09/13/2022    EOSRELPCT 1.2 09/13/2022    BASOPCT 0.3 09/13/2022    LYMPHOPCT 28.3 09/13/2022    MONOPCT 8.4 (H) 09/13/2022    SEGSABS 3.7 09/13/2022    EOSABS 0.1 09/13/2022 BASOSABS 0.0 09/13/2022    LYMPHSABS 1.7 09/13/2022    MONOSABS 0.5 09/13/2022    DIFFTYPE AUTOMATED DIFFERENTIAL 09/13/2022     Chemistry:  Lab Results   Component Value Date     01/03/2022    K 4.7 01/03/2022     01/03/2022    CO2 25 01/03/2022    BUN 17 01/03/2022    CREATININE 0.7 01/03/2022    GLUCOSE 113 (H) 01/03/2022    CALCIUM 9.9 01/03/2022    PROT 6.9 01/03/2022    LABALBU 4.2 01/03/2022    BILITOT 0.5 01/03/2022    ALKPHOS 111 01/03/2022    AST 21 01/03/2022    ALT 18 01/03/2022    LABGLOM >60 01/03/2022    GFRAA >60 01/03/2022     Immunology:  Lab Results   Component Value Date    PROT 6.9 01/03/2022      Observations:  No data recorded     Assessment & Plan:  1. She has two small invasive ductal carcinoma of left breast, 4 mm and 5 mm, HR positive and Her-mingo negative s/p mastectomy. She started Arimidex on March 31, 2021. She has been tolerating Arimidex well. Mammogram in January 2022 was benign. She takes gabapentin QHS for hot flashes. She denied any symptoms of recurrent breast cancer. 12/23/2022 MRI of breast: probably benign. F/u in 6 months. She denied any symptoms of recurrent breast cancer. I recommend monthly self breast exam.    2. She has osteopenia. Bone density was in December 2020.    12/2/2022 DEXA right hip osteoporosis. She will start boniva prescribed by PCP. I recommend to take vitamin D 2000 IU and 1200 mg calcium daily. 3. Genetic counseling was done. Result was negative. 4. She has osteoarthritis. She takes ibuprofen and tylenol as needed. 5.  She has anemia of iron deficiency. She started iron pill on August 11, 2021 and stopped in September 2021. Cologuard in January 2021 was negative. She had upper and lower endoscopic study in September 2021. She had AVM which was coagulated. 1/12/2023 CMP grossly unremarkable with random . Hg 12.1, MCV 90.7. Ferritin 23, TIBC 378. She will continue with oral iron supplement, slow fe.   She wants to defer iron infusion. We will repeat iron study and CBC prior to next office visit. We discussed about healthy diet and life style. She had Covid vaccine. RTC in 16 weeks or sooner. All of her questions have been answered for today.

## 2022-12-27 DIAGNOSIS — Z13.820 ENCOUNTER FOR OSTEOPOROSIS SCREENING IN ASYMPTOMATIC POSTMENOPAUSAL PATIENT: ICD-10-CM

## 2022-12-27 DIAGNOSIS — Z78.0 ENCOUNTER FOR OSTEOPOROSIS SCREENING IN ASYMPTOMATIC POSTMENOPAUSAL PATIENT: ICD-10-CM

## 2023-01-12 ENCOUNTER — HOSPITAL ENCOUNTER (OUTPATIENT)
Dept: INFUSION THERAPY | Age: 68
Discharge: HOME | End: 2023-01-12
Payer: MEDICARE

## 2023-01-12 DIAGNOSIS — Z78.0 MENOPAUSE: ICD-10-CM

## 2023-01-12 DIAGNOSIS — Z86.2 HISTORY OF IRON DEFICIENCY ANEMIA: ICD-10-CM

## 2023-01-12 DIAGNOSIS — Z17.0 MALIGNANT NEOPLASM OF UPPER-OUTER QUADRANT OF LEFT BREAST IN FEMALE, ESTROGEN RECEPTOR POSITIVE (HCC): ICD-10-CM

## 2023-01-12 DIAGNOSIS — C50.412 MALIGNANT NEOPLASM OF UPPER-OUTER QUADRANT OF LEFT BREAST IN FEMALE, ESTROGEN RECEPTOR POSITIVE (HCC): ICD-10-CM

## 2023-01-12 LAB
ALBUMIN SERPL-MCNC: 4.6 GM/DL (ref 3.4–5)
ALP BLD-CCNC: 109 IU/L (ref 40–129)
ALT SERPL-CCNC: 19 U/L (ref 10–40)
ANION GAP SERPL CALCULATED.3IONS-SCNC: 9 MMOL/L (ref 4–16)
AST SERPL-CCNC: 23 IU/L (ref 15–37)
BASOPHILS ABSOLUTE: 0 K/CU MM
BASOPHILS RELATIVE PERCENT: 0.3 % (ref 0–1)
BILIRUB SERPL-MCNC: 0.4 MG/DL (ref 0–1)
BUN BLDV-MCNC: 18 MG/DL (ref 6–23)
CALCIUM SERPL-MCNC: 9.7 MG/DL (ref 8.3–10.6)
CHLORIDE BLD-SCNC: 101 MMOL/L (ref 99–110)
CO2: 27 MMOL/L (ref 21–32)
CREAT SERPL-MCNC: 0.7 MG/DL (ref 0.6–1.1)
DIFFERENTIAL TYPE: ABNORMAL
EOSINOPHILS ABSOLUTE: 0.1 K/CU MM
EOSINOPHILS RELATIVE PERCENT: 1 % (ref 0–3)
FERRITIN: 23 NG/ML (ref 15–150)
GFR SERPL CREATININE-BSD FRML MDRD: >60 ML/MIN/1.73M2
GLUCOSE BLD-MCNC: 114 MG/DL (ref 70–99)
HCT VFR BLD CALC: 37.1 % (ref 37–47)
HEMOGLOBIN: 12.1 GM/DL (ref 12.5–16)
IRON: 102 UG/DL (ref 37–145)
LYMPHOCYTES ABSOLUTE: 1.8 K/CU MM
LYMPHOCYTES RELATIVE PERCENT: 29.4 % (ref 24–44)
MCH RBC QN AUTO: 29.6 PG (ref 27–31)
MCHC RBC AUTO-ENTMCNC: 32.6 % (ref 32–36)
MCV RBC AUTO: 90.7 FL (ref 78–100)
MONOCYTES ABSOLUTE: 0.6 K/CU MM
MONOCYTES RELATIVE PERCENT: 9.8 % (ref 0–4)
PCT TRANSFERRIN: 27 % (ref 10–44)
PDW BLD-RTO: 13.8 % (ref 11.7–14.9)
PLATELET # BLD: 284 K/CU MM (ref 140–440)
PMV BLD AUTO: 8.7 FL (ref 7.5–11.1)
POTASSIUM SERPL-SCNC: 4.9 MMOL/L (ref 3.5–5.1)
RBC # BLD: 4.09 M/CU MM (ref 4.2–5.4)
SEGMENTED NEUTROPHILS ABSOLUTE COUNT: 3.6 K/CU MM
SEGMENTED NEUTROPHILS RELATIVE PERCENT: 59.5 % (ref 36–66)
SODIUM BLD-SCNC: 137 MMOL/L (ref 135–145)
TOTAL IRON BINDING CAPACITY: 378 UG/DL (ref 250–450)
TOTAL PROTEIN: 7.1 GM/DL (ref 6.4–8.2)
UNSATURATED IRON BINDING CAPACITY: 276 UG/DL (ref 110–370)
WBC # BLD: 6 K/CU MM (ref 4–10.5)

## 2023-01-12 PROCEDURE — 85025 COMPLETE CBC W/AUTO DIFF WBC: CPT

## 2023-01-12 PROCEDURE — 80053 COMPREHEN METABOLIC PANEL: CPT

## 2023-01-12 PROCEDURE — 36415 COLL VENOUS BLD VENIPUNCTURE: CPT

## 2023-01-12 PROCEDURE — 82728 ASSAY OF FERRITIN: CPT

## 2023-01-12 PROCEDURE — 83540 ASSAY OF IRON: CPT

## 2023-01-12 PROCEDURE — 83550 IRON BINDING TEST: CPT

## 2023-01-17 ENCOUNTER — OFFICE VISIT (OUTPATIENT)
Dept: OBGYN | Age: 68
End: 2023-01-17
Payer: MEDICARE

## 2023-01-17 VITALS
DIASTOLIC BLOOD PRESSURE: 84 MMHG | WEIGHT: 170 LBS | HEART RATE: 109 BPM | BODY MASS INDEX: 31.28 KG/M2 | HEIGHT: 62 IN | SYSTOLIC BLOOD PRESSURE: 141 MMHG

## 2023-01-17 DIAGNOSIS — M85.852 OSTEOPENIA OF NECK OF LEFT FEMUR: ICD-10-CM

## 2023-01-17 DIAGNOSIS — M81.6 LOCALIZED OSTEOPOROSIS WITHOUT CURRENT PATHOLOGICAL FRACTURE: Primary | ICD-10-CM

## 2023-01-17 PROBLEM — M81.0 OSTEOPOROSIS: Status: ACTIVE | Noted: 2023-01-17

## 2023-01-17 PROCEDURE — 99214 OFFICE O/P EST MOD 30 MIN: CPT | Performed by: NURSE PRACTITIONER

## 2023-01-17 PROCEDURE — G8417 CALC BMI ABV UP PARAM F/U: HCPCS | Performed by: NURSE PRACTITIONER

## 2023-01-17 PROCEDURE — G8427 DOCREV CUR MEDS BY ELIG CLIN: HCPCS | Performed by: NURSE PRACTITIONER

## 2023-01-17 PROCEDURE — 3017F COLORECTAL CA SCREEN DOC REV: CPT | Performed by: NURSE PRACTITIONER

## 2023-01-17 PROCEDURE — 1036F TOBACCO NON-USER: CPT | Performed by: NURSE PRACTITIONER

## 2023-01-17 PROCEDURE — 1123F ACP DISCUSS/DSCN MKR DOCD: CPT | Performed by: NURSE PRACTITIONER

## 2023-01-17 PROCEDURE — 1090F PRES/ABSN URINE INCON ASSESS: CPT | Performed by: NURSE PRACTITIONER

## 2023-01-17 PROCEDURE — G8484 FLU IMMUNIZE NO ADMIN: HCPCS | Performed by: NURSE PRACTITIONER

## 2023-01-17 PROCEDURE — G8399 PT W/DXA RESULTS DOCUMENT: HCPCS | Performed by: NURSE PRACTITIONER

## 2023-01-17 RX ORDER — CALCIUM CARBONATE 500(1250)
500 TABLET ORAL DAILY
COMMUNITY

## 2023-01-17 RX ORDER — IBANDRONATE SODIUM 150 MG/1
150 TABLET, FILM COATED ORAL
Qty: 3 TABLET | Refills: 3 | Status: SHIPPED | OUTPATIENT
Start: 2023-01-17

## 2023-01-17 RX ORDER — MULTIVIT-MIN/IRON/FOLIC ACID/K 18-600-40
CAPSULE ORAL
COMMUNITY

## 2023-01-17 ASSESSMENT — PATIENT HEALTH QUESTIONNAIRE - PHQ9
2. FEELING DOWN, DEPRESSED OR HOPELESS: 0
SUM OF ALL RESPONSES TO PHQ QUESTIONS 1-9: 0
1. LITTLE INTEREST OR PLEASURE IN DOING THINGS: 0
SUM OF ALL RESPONSES TO PHQ9 QUESTIONS 1 & 2: 0
SUM OF ALL RESPONSES TO PHQ QUESTIONS 1-9: 0

## 2023-01-17 ASSESSMENT — ENCOUNTER SYMPTOMS
SHORTNESS OF BREATH: 0
RESPIRATORY NEGATIVE: 1
GASTROINTESTINAL NEGATIVE: 1
VOMITING: 0
ABDOMINAL PAIN: 0
DIARRHEA: 0
NAUSEA: 0
CONSTIPATION: 0

## 2023-01-17 NOTE — PROGRESS NOTES
1/17/23    Rivka Klein  1955    Chief Complaint   Patient presents with    Follow-up     8pt here to discuss dexa results showing osteopenia and osteoporosis. Taking Caltrate and vitamin d.          Rivka Klein is a 78 y.o. female who presents today for evaluation of see above    Past Medical History:   Diagnosis Date    Arthritis     Cancer ([address] 01/2021    L breast     Depression     GERD (gastroesophageal reflux disease)     History of blood transfusion     Hyperlipidemia     Osteopenia     Osteoporosis     Pap smear for cervical cancer screening 01/08/2009    Neg    Pap smear for cervical cancer screening 01/28/2010    Neg    Pap smear for cervical cancer screening 07/14/2011    Neg    Rectocele        Past Surgical History:   Procedure Laterality Date    BREAST BIOPSY      COLONOSCOPY  11/2008    COLONOSCOPY N/A 9/29/2021    COLONOSCOPY CONTROL HEMORRHAGE performed by Earnest Hammond MD at [address] [place] [address] Left 2019    partial knee    KNEE SURGERY      MASTECTOMY, PARTIAL Left 03/08/2021    PELVIC LAPAROSCOPY  1985    TUBAL LIGATION  1991    UPPER GASTROINTESTINAL ENDOSCOPY N/A 9/29/2021    EGD BIOPSY performed by [facility]arnest Hammond MD at [facility] 69 History     Tobacco Use    Smoking status: Never    Smokeless tobacco: Never   Substance Use Topics    Alcohol use: Yes    Drug use: No       Family History   Problem Relation Age of Onset    Heart Failure Father     Heart Disease Father     High Cholesterol Father     Coronary Art Dis Father     Arthritis Father     Stroke Mother     Heart Failure Maternal Grandmother     Hypertension Maternal Grandmother     High Blood Pressure Maternal Grandmother     Cancer Paternal Grandmother     Diabetes Paternal Grandmother     Arthritis Paternal Aunt        Current Outpatient Medications   Medication Sig Dispense Refill    calcium carbonate (OSCAL) 500 MG TABS tablet Take 500 mg by mouth daily Cholecalciferol (VITAMIN D) 50 MCG (2000 UT) CAPS capsule Take by mouth      ibandronate (BONIVA) 150 MG tablet Take 1 tablet by mouth every 30 days Take one (1) tablet once per month in the morning with a full glass of water, on an empty stomach, and do not take anything else by mouth or lie down for the next 60 minutes. 3 tablet 3    gabapentin (NEURONTIN) 300 MG capsule Take 1 capsule by mouth nightly for 180 days. Intended supply: 90 days 90 capsule 3    metFORMIN (GLUCOPHAGE) 500 MG tablet TAKE 1 TABLET BY MOUTH EVERY DAY      atorvastatin (LIPITOR) 80 MG tablet TAKE 1 TABLET BY MOUTH EVERY DAY      anastrozole (ARIMIDEX) 1 MG tablet Take 1 tablet by mouth daily 90 tablet 1    ferrous sulfate (IRON 325) 325 (65 Fe) MG tablet Take 325 mg by mouth daily (with breakfast)       omeprazole (PRILOSEC) 40 MG delayed release capsule TAKE 1 CAPSULE BY MOUTH DAILY      FLUoxetine (PROZAC) 20 MG capsule        No current facility-administered medications for this visit. No Known Allergies        Immunization History   Administered Date(s) Administered    COVID-19, PFIZER Bivalent BOOSTER, DO NOT Dilute, (age 12y+), IM, 30 mcg/0.3 mL 2022    COVID-19, PFIZER GRAY top, DO NOT Dilute, (age 15 y+), IM, 30 mcg/0.3 mL 2022    COVID-19, PFIZER PURPLE top, DILUTE for use, (age 15 y+), 30mcg/0.3mL 2021, 2021, 2021    Influenza A (B1G7-34) Vaccine PF IM 2009    Influenza, FLUARIX, FLULAVAL, FLUZONE (age 10 mo+) AND AFLURIA, (age 1 y+), PF, 0.5mL 10/24/2019    Influenza, FLUZONE (age 72 y+), High Dose, 0.7mL 2021    Pneumococcal Conjugate 13-valent (Rod Balm) 2021       Review of Systems   Constitutional: Negative. Negative for fatigue. Respiratory: Negative. Negative for shortness of breath. Gastrointestinal: Negative. Negative for abdominal pain, constipation, diarrhea, nausea and vomiting. Genitourinary: Negative. Neurological:  Negative for dizziness. Psychiatric/Behavioral: Negative. BP (!) 141/84 (Site: Right Upper Arm, Position: Sitting, Cuff Size: Large Adult)   Pulse (!) 109   Ht 5' 2\" (1.575 m)   Wt 170 lb (77.1 kg)   BMI 31.09 kg/m²     Physical Exam  Vitals and nursing note reviewed. Constitutional:       Appearance: Normal appearance. HENT:      Head: Normocephalic. Pulmonary:      Effort: Pulmonary effort is normal.   Musculoskeletal:         General: Normal range of motion. Cervical back: Normal range of motion. Skin:     General: Skin is warm and dry. Neurological:      Mental Status: She is alert. Psychiatric:         Mood and Affect: Mood normal.       No results found for this visit on 01/17/23. ASSESSMENT AND PLAN   Diagnosis Orders   1. Localized osteoporosis without current pathological fracture        2. Osteopenia of neck of left femur          Dexa scan reviewed; pt has started taking Calcium Citrate/Vitamin D3 daily and performs weight bearing exercises atleast three times weekly    Will start monthly Boniva; monitor for potential side effects, risks reviewed       No follow-ups on file.     Claudene Housekeeper, APRN - CNP

## 2023-01-19 ENCOUNTER — HOSPITAL ENCOUNTER (OUTPATIENT)
Dept: INFUSION THERAPY | Age: 68
Discharge: HOME | End: 2023-01-19
Payer: MEDICARE

## 2023-01-19 ENCOUNTER — OFFICE VISIT (OUTPATIENT)
Dept: ONCOLOGY | Age: 68
End: 2023-01-19
Payer: MEDICARE

## 2023-01-19 VITALS
DIASTOLIC BLOOD PRESSURE: 92 MMHG | OXYGEN SATURATION: 98 % | HEIGHT: 62 IN | WEIGHT: 167.6 LBS | TEMPERATURE: 97.6 F | HEART RATE: 115 BPM | RESPIRATION RATE: 16 BRPM | SYSTOLIC BLOOD PRESSURE: 144 MMHG | BODY MASS INDEX: 30.84 KG/M2

## 2023-01-19 DIAGNOSIS — Z86.2 HISTORY OF IRON DEFICIENCY ANEMIA: Primary | ICD-10-CM

## 2023-01-19 PROCEDURE — 3017F COLORECTAL CA SCREEN DOC REV: CPT | Performed by: INTERNAL MEDICINE

## 2023-01-19 PROCEDURE — G8484 FLU IMMUNIZE NO ADMIN: HCPCS | Performed by: INTERNAL MEDICINE

## 2023-01-19 PROCEDURE — G8427 DOCREV CUR MEDS BY ELIG CLIN: HCPCS | Performed by: INTERNAL MEDICINE

## 2023-01-19 PROCEDURE — 1123F ACP DISCUSS/DSCN MKR DOCD: CPT | Performed by: INTERNAL MEDICINE

## 2023-01-19 PROCEDURE — 99211 OFF/OP EST MAY X REQ PHY/QHP: CPT

## 2023-01-19 PROCEDURE — G8417 CALC BMI ABV UP PARAM F/U: HCPCS | Performed by: INTERNAL MEDICINE

## 2023-01-19 PROCEDURE — 99213 OFFICE O/P EST LOW 20 MIN: CPT | Performed by: INTERNAL MEDICINE

## 2023-01-19 PROCEDURE — G8399 PT W/DXA RESULTS DOCUMENT: HCPCS | Performed by: INTERNAL MEDICINE

## 2023-01-19 PROCEDURE — 1090F PRES/ABSN URINE INCON ASSESS: CPT | Performed by: INTERNAL MEDICINE

## 2023-01-19 PROCEDURE — 1036F TOBACCO NON-USER: CPT | Performed by: INTERNAL MEDICINE

## 2023-01-19 RX ORDER — TRAZODONE HYDROCHLORIDE 50 MG/1
TABLET ORAL
COMMUNITY
Start: 2022-11-06

## 2023-01-19 NOTE — PROGRESS NOTES
[address] [facility] [place] Rooming Questions  Patient: Dnaiel Mayfield  MRN: [id number]    Date: 1/19/2023        1. Do you have any new issues? yes - Osteoporosis in right hip. 2. Do you need any refills on medications?    no    3. Have you had any imaging done since your last visit? yes - Mammo, DEXA, MRI    4. Have you been hospitalized or seen in the emergency room since your last visit here?   no    5. Did the patient have a depression screening completed today?  No    PHQ-9 Total Score: 0 (1/17/2023  9:20 AM)       PHQ-9 Given to (if applicable):               PHQ-9 Score (if applicable):                     [] Positive     []  Negative              Does question #9 need addressed (if applicable)                     [] Yes    []  No               Luis Maqruez CMA

## 2023-05-22 RX ORDER — ANASTROZOLE 1 MG/1
1 TABLET ORAL DAILY
Qty: 90 TABLET | Refills: 1 | Status: SHIPPED | OUTPATIENT
Start: 2023-05-22

## 2023-06-22 ENCOUNTER — HOSPITAL ENCOUNTER (OUTPATIENT)
Dept: INFUSION THERAPY | Age: 68
Discharge: HOME | End: 2023-06-22
Payer: MEDICARE

## 2023-06-22 DIAGNOSIS — Z86.2 HISTORY OF IRON DEFICIENCY ANEMIA: ICD-10-CM

## 2023-06-22 LAB
ALBUMIN SERPL-MCNC: 4.6 GM/DL (ref 3.4–5)
ALP BLD-CCNC: 92 IU/L (ref 40–129)
ALT SERPL-CCNC: 17 U/L (ref 10–40)
ANION GAP SERPL CALCULATED.3IONS-SCNC: 12 MMOL/L (ref 4–16)
AST SERPL-CCNC: 21 IU/L (ref 15–37)
BASOPHILS ABSOLUTE: 0 K/CU MM
BASOPHILS RELATIVE PERCENT: 0.3 % (ref 0–1)
BILIRUB SERPL-MCNC: 0.3 MG/DL (ref 0–1)
BUN SERPL-MCNC: 13 MG/DL (ref 6–23)
CALCIUM SERPL-MCNC: 8.8 MG/DL (ref 8.3–10.6)
CHLORIDE BLD-SCNC: 102 MMOL/L (ref 99–110)
CO2: 25 MMOL/L (ref 21–32)
CREAT SERPL-MCNC: 0.7 MG/DL (ref 0.6–1.1)
DIFFERENTIAL TYPE: ABNORMAL
EOSINOPHILS ABSOLUTE: 0.1 K/CU MM
EOSINOPHILS RELATIVE PERCENT: 1 % (ref 0–3)
FERRITIN: 53 NG/ML (ref 15–150)
GFR SERPL CREATININE-BSD FRML MDRD: >60 ML/MIN/1.73M2
GLUCOSE SERPL-MCNC: 108 MG/DL (ref 70–99)
HCT VFR BLD CALC: 36.9 % (ref 37–47)
HEMOGLOBIN: 12 GM/DL (ref 12.5–16)
IRON: 55 UG/DL (ref 37–145)
LYMPHOCYTES ABSOLUTE: 1.6 K/CU MM
LYMPHOCYTES RELATIVE PERCENT: 19.4 % (ref 24–44)
MCH RBC QN AUTO: 30.5 PG (ref 27–31)
MCHC RBC AUTO-ENTMCNC: 32.5 % (ref 32–36)
MCV RBC AUTO: 93.7 FL (ref 78–100)
MONOCYTES ABSOLUTE: 0.6 K/CU MM
MONOCYTES RELATIVE PERCENT: 7.4 % (ref 0–4)
PCT TRANSFERRIN: 16 % (ref 10–44)
PDW BLD-RTO: 13.3 % (ref 11.7–14.9)
PLATELET # BLD: 286 K/CU MM (ref 140–440)
PMV BLD AUTO: 8.9 FL (ref 7.5–11.1)
POTASSIUM SERPL-SCNC: 4.4 MMOL/L (ref 3.5–5.1)
RBC # BLD: 3.94 M/CU MM (ref 4.2–5.4)
SEGMENTED NEUTROPHILS ABSOLUTE COUNT: 5.8 K/CU MM
SEGMENTED NEUTROPHILS RELATIVE PERCENT: 71.9 % (ref 36–66)
SODIUM BLD-SCNC: 139 MMOL/L (ref 135–145)
TOTAL IRON BINDING CAPACITY: 339 UG/DL (ref 250–450)
TOTAL PROTEIN: 6.7 GM/DL (ref 6.4–8.2)
UNSATURATED IRON BINDING CAPACITY: 284 UG/DL (ref 110–370)
WBC # BLD: 8 K/CU MM (ref 4–10.5)

## 2023-06-22 PROCEDURE — 83550 IRON BINDING TEST: CPT

## 2023-06-22 PROCEDURE — 36415 COLL VENOUS BLD VENIPUNCTURE: CPT

## 2023-06-22 PROCEDURE — 85025 COMPLETE CBC W/AUTO DIFF WBC: CPT

## 2023-06-22 PROCEDURE — 80053 COMPREHEN METABOLIC PANEL: CPT

## 2023-06-22 PROCEDURE — 83540 ASSAY OF IRON: CPT

## 2023-06-22 PROCEDURE — 82728 ASSAY OF FERRITIN: CPT

## 2023-06-27 ENCOUNTER — OFFICE VISIT (OUTPATIENT)
Dept: ONCOLOGY | Age: 68
End: 2023-06-27
Payer: MEDICARE

## 2023-06-27 ENCOUNTER — HOSPITAL ENCOUNTER (OUTPATIENT)
Dept: INFUSION THERAPY | Age: 68
Discharge: HOME | End: 2023-06-27
Payer: MEDICARE

## 2023-06-27 VITALS
HEIGHT: 62 IN | SYSTOLIC BLOOD PRESSURE: 133 MMHG | WEIGHT: 170.8 LBS | BODY MASS INDEX: 31.43 KG/M2 | OXYGEN SATURATION: 96 % | HEART RATE: 105 BPM | TEMPERATURE: 97.1 F | DIASTOLIC BLOOD PRESSURE: 78 MMHG

## 2023-06-27 DIAGNOSIS — D64.9 ANEMIA, UNSPECIFIED TYPE: Primary | ICD-10-CM

## 2023-06-27 PROCEDURE — G8417 CALC BMI ABV UP PARAM F/U: HCPCS | Performed by: INTERNAL MEDICINE

## 2023-06-27 PROCEDURE — G8399 PT W/DXA RESULTS DOCUMENT: HCPCS | Performed by: INTERNAL MEDICINE

## 2023-06-27 PROCEDURE — 1090F PRES/ABSN URINE INCON ASSESS: CPT | Performed by: INTERNAL MEDICINE

## 2023-06-27 PROCEDURE — G8427 DOCREV CUR MEDS BY ELIG CLIN: HCPCS | Performed by: INTERNAL MEDICINE

## 2023-06-27 PROCEDURE — 3017F COLORECTAL CA SCREEN DOC REV: CPT | Performed by: INTERNAL MEDICINE

## 2023-06-27 PROCEDURE — 99213 OFFICE O/P EST LOW 20 MIN: CPT | Performed by: INTERNAL MEDICINE

## 2023-06-27 PROCEDURE — 99211 OFF/OP EST MAY X REQ PHY/QHP: CPT

## 2023-06-27 PROCEDURE — 1036F TOBACCO NON-USER: CPT | Performed by: INTERNAL MEDICINE

## 2023-06-27 PROCEDURE — 1123F ACP DISCUSS/DSCN MKR DOCD: CPT | Performed by: INTERNAL MEDICINE

## 2023-06-27 RX ORDER — DULAGLUTIDE 0.75 MG/.5ML
INJECTION, SOLUTION SUBCUTANEOUS
COMMUNITY
Start: 2023-05-28

## 2023-06-27 ASSESSMENT — PATIENT HEALTH QUESTIONNAIRE - PHQ9
SUM OF ALL RESPONSES TO PHQ QUESTIONS 1-9: 0
1. LITTLE INTEREST OR PLEASURE IN DOING THINGS: 0
SUM OF ALL RESPONSES TO PHQ QUESTIONS 1-9: 0
SUM OF ALL RESPONSES TO PHQ9 QUESTIONS 1 & 2: 0
2. FEELING DOWN, DEPRESSED OR HOPELESS: 0

## 2023-10-27 RX ORDER — IBANDRONATE SODIUM 150 MG/1
TABLET, FILM COATED ORAL
Qty: 3 TABLET | Refills: 3 | OUTPATIENT
Start: 2023-10-27

## 2023-10-30 RX ORDER — ANASTROZOLE 1 MG/1
1 TABLET ORAL DAILY
Qty: 90 TABLET | Refills: 1 | OUTPATIENT
Start: 2023-10-30

## 2023-11-01 RX ORDER — GABAPENTIN 300 MG/1
300 CAPSULE ORAL
Qty: 90 CAPSULE | Refills: 3 | OUTPATIENT
Start: 2023-11-01

## 2023-11-05 NOTE — PROGRESS NOTES
Patient Name:  Brandt Keith  Patient :  1955  Patient MRN:  [id number]     Primary Oncologist: Geeta Morel MD  Referring Provider: TARA Teixeira CNP     Date of Service: 2023      Chief Complaint:    Chief Complaint   Patient presents with    Follow-up      She came in for follow-up visit. 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  Previous mammogram was in 2016. Diagnostic mammogram with US on 2021 showed at 1 o'clock left breast there was an irregular hypoechoic mass with associated calcifications and posterior acoustic shadowing measuring 1.4.x1.1x1.4 cm. Impression: Highly suspicious mass 1 o'clock left breast and increased calcifications upper-outer quadrant left breast.    She had US guided biopsy on 2021. Pathology report showed invasive ductal carcinoma with focal LCIS at posterior lateral tumor and 1 o'clock tumor. CBC and CMP were unremarkable. CBC and CMP in 2021 were unremarkable, except for mildly elevated alkaline phosphatase at 116. CT chest, abdomen and pelvis on 2021 was negative for metastatic disease. Bone scan in 2021 was negative. She elected to have left breast mastectomy followed by reconstruction. She has left breast mastectomy with LN biopsy on 3/8/2021. Pathology report showed : no residual cancer on mastectomy specimen. Tumor focality: 2 sites. Tumor site: Posterior lateral.  Tumor size: 4 mm. Histologic type: Invasive ductal carcinoma. Histologic grade: Grade 1  ER by IHC was more than 95%, MN by IHC was more than 95%, HER-2/mingo by IHC was 0. Tumor site: 1:00  Tumor size: 5 mm  Histologic type:  Invasive ductal carcinoma  Histologic grade: Grade 1  ER biopsy was more than 95%, MN by IHC

## 2023-11-17 RX ORDER — ANASTROZOLE 1 MG/1
1 TABLET ORAL DAILY
Qty: 90 TABLET | Refills: 1 | Status: SHIPPED | OUTPATIENT
Start: 2023-11-17

## 2023-11-17 NOTE — TELEPHONE ENCOUNTER
Patient left message requesting a refill for Anastrozole to be sent to [place]. Pending RX to Provider to be sent to pharmacy.

## 2023-11-22 ENCOUNTER — HOSPITAL ENCOUNTER (OUTPATIENT)
Dept: INFUSION THERAPY | Age: 68
Discharge: HOME | End: 2023-11-22
Payer: MEDICARE

## 2023-11-22 DIAGNOSIS — D64.9 ANEMIA, UNSPECIFIED TYPE: ICD-10-CM

## 2023-11-22 LAB
ALBUMIN SERPL-MCNC: 4.9 GM/DL (ref 3.4–5)
ALP BLD-CCNC: 96 IU/L (ref 40–129)
ALT SERPL-CCNC: 23 U/L (ref 10–40)
ANION GAP SERPL CALCULATED.3IONS-SCNC: 14 MMOL/L (ref 4–16)
AST SERPL-CCNC: 27 IU/L (ref 15–37)
BASOPHILS ABSOLUTE: 0 K/CU MM
BASOPHILS RELATIVE PERCENT: 0.5 % (ref 0–1)
BILIRUB SERPL-MCNC: 0.2 MG/DL (ref 0–1)
BUN SERPL-MCNC: 14 MG/DL (ref 6–23)
CALCIUM SERPL-MCNC: 9.6 MG/DL (ref 8.3–10.6)
CHLORIDE BLD-SCNC: 99 MMOL/L (ref 99–110)
CO2: 26 MMOL/L (ref 21–32)
CREAT SERPL-MCNC: 0.8 MG/DL (ref 0.6–1.1)
DIFFERENTIAL TYPE: ABNORMAL
EOSINOPHILS ABSOLUTE: 0.1 K/CU MM
EOSINOPHILS RELATIVE PERCENT: 1.7 % (ref 0–3)
FERRITIN: 47 NG/ML (ref 15–150)
GFR SERPL CREATININE-BSD FRML MDRD: >60 ML/MIN/1.73M2
GLUCOSE SERPL-MCNC: 106 MG/DL (ref 70–99)
HCT VFR BLD CALC: 36.6 % (ref 37–47)
HEMOGLOBIN: 12 GM/DL (ref 12.5–16)
IRON: 54 UG/DL (ref 37–145)
LYMPHOCYTES ABSOLUTE: 2 K/CU MM
LYMPHOCYTES RELATIVE PERCENT: 31 % (ref 24–44)
MCH RBC QN AUTO: 30.3 PG (ref 27–31)
MCHC RBC AUTO-ENTMCNC: 32.8 % (ref 32–36)
MCV RBC AUTO: 92.4 FL (ref 78–100)
MONOCYTES ABSOLUTE: 0.6 K/CU MM
MONOCYTES RELATIVE PERCENT: 8.7 % (ref 0–4)
PCT TRANSFERRIN: 15 % (ref 10–44)
PDW BLD-RTO: 14 % (ref 11.7–14.9)
PLATELET # BLD: 281 K/CU MM (ref 140–440)
PMV BLD AUTO: 9 FL (ref 7.5–11.1)
POTASSIUM SERPL-SCNC: 4.2 MMOL/L (ref 3.5–5.1)
RBC # BLD: 3.96 M/CU MM (ref 4.2–5.4)
SEGMENTED NEUTROPHILS ABSOLUTE COUNT: 3.7 K/CU MM
SEGMENTED NEUTROPHILS RELATIVE PERCENT: 58.1 % (ref 36–66)
SODIUM BLD-SCNC: 139 MMOL/L (ref 135–145)
TOTAL IRON BINDING CAPACITY: 358 UG/DL (ref 250–450)
TOTAL PROTEIN: 7.4 GM/DL (ref 6.4–8.2)
UNSATURATED IRON BINDING CAPACITY: 304 UG/DL (ref 110–370)
WBC # BLD: 6.4 K/CU MM (ref 4–10.5)

## 2023-11-22 PROCEDURE — 82728 ASSAY OF FERRITIN: CPT

## 2023-11-22 PROCEDURE — 36415 COLL VENOUS BLD VENIPUNCTURE: CPT

## 2023-11-22 PROCEDURE — 83550 IRON BINDING TEST: CPT

## 2023-11-22 PROCEDURE — 85025 COMPLETE CBC W/AUTO DIFF WBC: CPT

## 2023-11-22 PROCEDURE — 83540 ASSAY OF IRON: CPT

## 2023-11-22 PROCEDURE — 80053 COMPREHEN METABOLIC PANEL: CPT

## 2023-11-29 ENCOUNTER — OFFICE VISIT (OUTPATIENT)
Dept: ONCOLOGY | Age: 68
End: 2023-11-29
Payer: MEDICARE

## 2023-11-29 ENCOUNTER — HOSPITAL ENCOUNTER (OUTPATIENT)
Dept: INFUSION THERAPY | Age: 68
Discharge: HOME | End: 2023-11-29
Payer: MEDICARE

## 2023-11-29 VITALS
TEMPERATURE: 97.4 F | HEIGHT: 62 IN | SYSTOLIC BLOOD PRESSURE: 148 MMHG | RESPIRATION RATE: 16 BRPM | WEIGHT: 170.6 LBS | HEART RATE: 96 BPM | BODY MASS INDEX: 31.39 KG/M2 | DIASTOLIC BLOOD PRESSURE: 75 MMHG | OXYGEN SATURATION: 97 %

## 2023-11-29 DIAGNOSIS — D64.9 ANEMIA, UNSPECIFIED TYPE: Primary | ICD-10-CM

## 2023-11-29 PROCEDURE — 99211 OFF/OP EST MAY X REQ PHY/QHP: CPT

## 2023-11-29 PROCEDURE — G8417 CALC BMI ABV UP PARAM F/U: HCPCS | Performed by: INTERNAL MEDICINE

## 2023-11-29 PROCEDURE — G8399 PT W/DXA RESULTS DOCUMENT: HCPCS | Performed by: INTERNAL MEDICINE

## 2023-11-29 PROCEDURE — G8427 DOCREV CUR MEDS BY ELIG CLIN: HCPCS | Performed by: INTERNAL MEDICINE

## 2023-11-29 PROCEDURE — 1123F ACP DISCUSS/DSCN MKR DOCD: CPT | Performed by: INTERNAL MEDICINE

## 2023-11-29 PROCEDURE — 1090F PRES/ABSN URINE INCON ASSESS: CPT | Performed by: INTERNAL MEDICINE

## 2023-11-29 PROCEDURE — 99213 OFFICE O/P EST LOW 20 MIN: CPT | Performed by: INTERNAL MEDICINE

## 2023-11-29 PROCEDURE — 1036F TOBACCO NON-USER: CPT | Performed by: INTERNAL MEDICINE

## 2023-11-29 PROCEDURE — G8482 FLU IMMUNIZE ORDER/ADMIN: HCPCS | Performed by: INTERNAL MEDICINE

## 2023-11-29 PROCEDURE — 3017F COLORECTAL CA SCREEN DOC REV: CPT | Performed by: INTERNAL MEDICINE

## 2023-11-29 NOTE — PROGRESS NOTES
MA Rooming Questions  Patient: Pablo Hackett  MRN: [id number]    Date: 11/29/2023        1. Do you have any new issues?   no         2. Do you need any refills on medications?    no    3. Have you had any imaging done since your last visit? MRI breast- [place]    4. Have you been hospitalized or seen in the emergency room since your last visit here?   no    5. Did the patient have a depression screening completed today?  No    No data recorded     PHQ-9 Given to (if applicable):               PHQ-9 Score (if applicable):                     [] Positive     []  Negative              Does question #9 need addressed (if applicable)                     [] Yes    []  No               Andres Houston CMA

## 2023-12-03 SDOH — ECONOMIC STABILITY: INCOME INSECURITY: HOW HARD IS IT FOR YOU TO PAY FOR THE VERY BASICS LIKE FOOD, HOUSING, MEDICAL CARE, AND HEATING?: NOT HARD AT ALL

## 2023-12-03 SDOH — ECONOMIC STABILITY: FOOD INSECURITY: WITHIN THE PAST 12 MONTHS, THE FOOD YOU BOUGHT JUST DIDN'T LAST AND YOU DIDN'T HAVE MONEY TO GET MORE.: NEVER TRUE

## 2023-12-03 SDOH — ECONOMIC STABILITY: HOUSING INSECURITY
IN THE LAST 12 MONTHS, WAS THERE A TIME WHEN YOU DID NOT HAVE A STEADY PLACE TO SLEEP OR SLEPT IN A SHELTER (INCLUDING NOW)?: NO

## 2023-12-03 SDOH — ECONOMIC STABILITY: FOOD INSECURITY: WITHIN THE PAST 12 MONTHS, YOU WORRIED THAT YOUR FOOD WOULD RUN OUT BEFORE YOU GOT MONEY TO BUY MORE.: NEVER TRUE

## 2023-12-04 ENCOUNTER — OFFICE VISIT (OUTPATIENT)
Dept: OBGYN | Age: 68
End: 2023-12-04
Payer: MEDICARE

## 2023-12-04 VITALS
SYSTOLIC BLOOD PRESSURE: 124 MMHG | WEIGHT: 173 LBS | BODY MASS INDEX: 31.83 KG/M2 | HEIGHT: 62 IN | DIASTOLIC BLOOD PRESSURE: 76 MMHG

## 2023-12-04 DIAGNOSIS — M81.0 OSTEOPOROSIS, UNSPECIFIED OSTEOPOROSIS TYPE, UNSPECIFIED PATHOLOGICAL FRACTURE PRESENCE: ICD-10-CM

## 2023-12-04 DIAGNOSIS — M85.80 OSTEOPENIA, UNSPECIFIED LOCATION: ICD-10-CM

## 2023-12-04 DIAGNOSIS — Z01.419 WOMEN'S ANNUAL ROUTINE GYNECOLOGICAL EXAMINATION: Primary | ICD-10-CM

## 2023-12-04 DIAGNOSIS — R61 NIGHT SWEATS: ICD-10-CM

## 2023-12-04 DIAGNOSIS — Z85.3 PERSONAL HISTORY OF BREAST CANCER: ICD-10-CM

## 2023-12-04 DIAGNOSIS — E66.9 OBESITY, UNSPECIFIED CLASSIFICATION, UNSPECIFIED OBESITY TYPE, UNSPECIFIED WHETHER SERIOUS COMORBIDITY PRESENT: ICD-10-CM

## 2023-12-04 PROCEDURE — G0101 CA SCREEN;PELVIC/BREAST EXAM: HCPCS | Performed by: NURSE PRACTITIONER

## 2023-12-04 RX ORDER — GABAPENTIN 300 MG/1
300 CAPSULE ORAL NIGHTLY
Qty: 90 CAPSULE | Refills: 3 | Status: SHIPPED | OUTPATIENT
Start: 2023-12-04 | End: 2024-12-03

## 2023-12-04 RX ORDER — IBANDRONATE SODIUM 150 MG/1
150 TABLET, FILM COATED ORAL
Qty: 3 TABLET | Refills: 3 | Status: SHIPPED | OUTPATIENT
Start: 2023-12-04

## 2023-12-04 ASSESSMENT — ENCOUNTER SYMPTOMS
SHORTNESS OF BREATH: 0
NAUSEA: 0
CONSTIPATION: 0
DIARRHEA: 0
GASTROINTESTINAL NEGATIVE: 1
RESPIRATORY NEGATIVE: 1
VOMITING: 0
ABDOMINAL PAIN: 0

## 2024-03-04 NOTE — PROGRESS NOTES
Patient Name:  Maeve Bui  Patient :  1955  Patient MRN:  [id number]     Primary Oncologist: Koffi Najera MD  Referring Provider: No primary care provider on file.     Date of Service: 2024      Chief Complaint:    Chief Complaint   Patient presents with    Follow-up      She came in for follow-up visit.    Patient Active Problem List:     Hyperlipemia     Breast cancer     HPI:   Maeve Bui is a pleasant 68-year-old female patient was referred for evaluation of left breast cancer. She is a nurse.   She was found to have an abnormal screening mammogram to the left breast. Previous mammogram was in 2016.  Diagnostic mammogram with US on 2021 showed at 1 o'clock left breast there was an irregular hypoechoic mass with associated calcifications and posterior acoustic shadowing measuring 1.4.x1.1x1.4 cm.  Impression: Highly suspicious mass 1 o'clock left breast and increased calcifications upper-outer quadrant left breast.    She had US guided biopsy on 2021.  Pathology report showed invasive ductal carcinoma with focal LCIS at posterior lateral tumor and 1 o'clock tumor.    CBC and CMP were unremarkable.  CBC and CMP in 2021 were unremarkable, except for mildly elevated alkaline phosphatase at 116.  CT chest, abdomen and pelvis on 2021 was negative for metastatic disease.  Bone scan in 2021 was negative.    She elected to have left breast mastectomy followed by reconstruction.  She has left breast mastectomy with LN biopsy on 3/8/2021.  Pathology report showed : no residual cancer on mastectomy specimen.  Tumor focality: 2 sites.  Tumor site: Posterior lateral.  Tumor size: 4 mm.  Histologic type: Invasive ductal carcinoma.  Histologic grade: Grade 1  ER by IHC was more than 95%, MS by IHC was more than 95%, HER-2/mingo by IHC was 0.  Tumor site: 1:00  Tumor size: 5 mm  Histologic type: Invasive ductal carcinoma  Histologic grade: Grade 1  ER biopsy was more than 95%, MS by

## 2024-03-25 ENCOUNTER — HOSPITAL ENCOUNTER (OUTPATIENT)
Dept: INFUSION THERAPY | Age: 69
Discharge: HOME | End: 2024-03-25
Payer: MEDICARE

## 2024-03-25 DIAGNOSIS — D64.9 ANEMIA, UNSPECIFIED TYPE: ICD-10-CM

## 2024-03-25 LAB
ALBUMIN SERPL-MCNC: 4.6 GM/DL (ref 3.4–5)
ALP BLD-CCNC: 88 IU/L (ref 40–129)
ALT SERPL-CCNC: 23 U/L (ref 10–40)
ANION GAP SERPL CALCULATED.3IONS-SCNC: 13 MMOL/L (ref 7–16)
AST SERPL-CCNC: 25 IU/L (ref 15–37)
BASOPHILS ABSOLUTE: 0 K/CU MM
BASOPHILS RELATIVE PERCENT: 0.3 % (ref 0–1)
BILIRUB SERPL-MCNC: 0.5 MG/DL (ref 0–1)
BUN SERPL-MCNC: 18 MG/DL (ref 6–23)
CALCIUM SERPL-MCNC: 10 MG/DL (ref 8.3–10.6)
CHLORIDE BLD-SCNC: 100 MMOL/L (ref 99–110)
CO2: 26 MMOL/L (ref 21–32)
CREAT SERPL-MCNC: 0.8 MG/DL (ref 0.6–1.1)
DIFFERENTIAL TYPE: ABNORMAL
EOSINOPHILS ABSOLUTE: 0.1 K/CU MM
EOSINOPHILS RELATIVE PERCENT: 0.7 % (ref 0–3)
FERRITIN: 73 NG/ML (ref 15–150)
GFR SERPL CREATININE-BSD FRML MDRD: 81 ML/MIN/1.73M2
GLUCOSE SERPL-MCNC: 117 MG/DL (ref 70–99)
HCT VFR BLD CALC: 39.9 % (ref 37–47)
HEMOGLOBIN: 13 GM/DL (ref 12.5–16)
IRON: 88 UG/DL (ref 37–145)
LYMPHOCYTES ABSOLUTE: 1.6 K/CU MM
LYMPHOCYTES RELATIVE PERCENT: 22.3 % (ref 24–44)
MCH RBC QN AUTO: 30 PG (ref 27–31)
MCHC RBC AUTO-ENTMCNC: 32.6 % (ref 32–36)
MCV RBC AUTO: 92.1 FL (ref 78–100)
MONOCYTES ABSOLUTE: 0.7 K/CU MM
MONOCYTES RELATIVE PERCENT: 9.1 % (ref 0–4)
PCT TRANSFERRIN: 25 % (ref 10–44)
PDW BLD-RTO: 14.1 % (ref 11.7–14.9)
PLATELET # BLD: 323 K/CU MM (ref 140–440)
PMV BLD AUTO: 8.7 FL (ref 7.5–11.1)
POTASSIUM SERPL-SCNC: 4.4 MMOL/L (ref 3.5–5.1)
RBC # BLD: 4.33 M/CU MM (ref 4.2–5.4)
SEGMENTED NEUTROPHILS ABSOLUTE COUNT: 4.8 K/CU MM
SEGMENTED NEUTROPHILS RELATIVE PERCENT: 67.6 % (ref 36–66)
SODIUM BLD-SCNC: 139 MMOL/L (ref 135–145)
TOTAL IRON BINDING CAPACITY: 353 UG/DL (ref 250–450)
TOTAL PROTEIN: 7.3 GM/DL (ref 6.4–8.2)
UNSATURATED IRON BINDING CAPACITY: 265 UG/DL (ref 110–370)
WBC # BLD: 7.2 K/CU MM (ref 4–10.5)

## 2024-03-25 PROCEDURE — 85025 COMPLETE CBC W/AUTO DIFF WBC: CPT

## 2024-03-25 PROCEDURE — 83550 IRON BINDING TEST: CPT

## 2024-03-25 PROCEDURE — 83540 ASSAY OF IRON: CPT

## 2024-03-25 PROCEDURE — 82728 ASSAY OF FERRITIN: CPT

## 2024-03-25 PROCEDURE — 36415 COLL VENOUS BLD VENIPUNCTURE: CPT

## 2024-03-25 PROCEDURE — 80053 COMPREHEN METABOLIC PANEL: CPT

## 2024-04-01 ENCOUNTER — OFFICE VISIT (OUTPATIENT)
Dept: ONCOLOGY | Age: 69
End: 2024-04-01
Payer: MEDICARE

## 2024-04-01 ENCOUNTER — HOSPITAL ENCOUNTER (OUTPATIENT)
Dept: INFUSION THERAPY | Age: 69
Discharge: HOME | End: 2024-04-01
Payer: MEDICARE

## 2024-04-01 VITALS
DIASTOLIC BLOOD PRESSURE: 78 MMHG | TEMPERATURE: 97.9 F | BODY MASS INDEX: 30.07 KG/M2 | OXYGEN SATURATION: 99 % | HEIGHT: 62 IN | SYSTOLIC BLOOD PRESSURE: 150 MMHG | HEART RATE: 115 BPM | WEIGHT: 163.4 LBS

## 2024-04-01 DIAGNOSIS — D64.9 ANEMIA, UNSPECIFIED TYPE: Primary | ICD-10-CM

## 2024-04-01 PROCEDURE — 99213 OFFICE O/P EST LOW 20 MIN: CPT | Performed by: INTERNAL MEDICINE

## 2024-04-01 PROCEDURE — G8417 CALC BMI ABV UP PARAM F/U: HCPCS | Performed by: INTERNAL MEDICINE

## 2024-04-01 PROCEDURE — 1123F ACP DISCUSS/DSCN MKR DOCD: CPT | Performed by: INTERNAL MEDICINE

## 2024-04-01 PROCEDURE — G8399 PT W/DXA RESULTS DOCUMENT: HCPCS | Performed by: INTERNAL MEDICINE

## 2024-04-01 PROCEDURE — G8427 DOCREV CUR MEDS BY ELIG CLIN: HCPCS | Performed by: INTERNAL MEDICINE

## 2024-04-01 PROCEDURE — 1090F PRES/ABSN URINE INCON ASSESS: CPT | Performed by: INTERNAL MEDICINE

## 2024-04-01 PROCEDURE — 1036F TOBACCO NON-USER: CPT | Performed by: INTERNAL MEDICINE

## 2024-04-01 PROCEDURE — 99211 OFF/OP EST MAY X REQ PHY/QHP: CPT

## 2024-04-01 PROCEDURE — 3017F COLORECTAL CA SCREEN DOC REV: CPT | Performed by: INTERNAL MEDICINE

## 2024-04-01 ASSESSMENT — PATIENT HEALTH QUESTIONNAIRE - PHQ9
SUM OF ALL RESPONSES TO PHQ QUESTIONS 1-9: 2
2. FEELING DOWN, DEPRESSED OR HOPELESS: MORE THAN HALF THE DAYS
SUM OF ALL RESPONSES TO PHQ9 QUESTIONS 1 & 2: 2
1. LITTLE INTEREST OR PLEASURE IN DOING THINGS: NOT AT ALL
SUM OF ALL RESPONSES TO PHQ QUESTIONS 1-9: 2

## 2024-04-01 NOTE — PROGRESS NOTES
MA Rooming Questions  Patient: Maeve Bui  MRN: [id number]    Date: 4/1/2024        1. Do you have any new issues?   no         2. Do you need any refills on medications?    no    3. Have you had any imaging done since your last visit?   no    4. Have you been hospitalized or seen in the emergency room since your last visit here?   no    5. Did the patient have a depression screening completed today? Yes    PHQ-9 Total Score: 2 (4/1/2024 11:24 AM)       PHQ-9 Given to (if applicable):               PHQ-9 Score (if applicable):                     [] Positive     []  Negative              Does question #9 need addressed (if applicable)                     [] Yes    []  No               Colleen Rdz MA

## 2024-04-09 NOTE — ADDENDUM NOTE
Encounter addended by: Tri Lord on: 4/9/2024 8:36 AM   Actions taken: Charge Capture section accepted

## 2024-04-29 RX ORDER — ANASTROZOLE 1 MG/1
1 TABLET ORAL DAILY
Qty: 90 TABLET | Refills: 1 | Status: SHIPPED | OUTPATIENT
Start: 2024-04-29

## 2024-09-01 ENCOUNTER — HOSPITAL ENCOUNTER (EMERGENCY)
Age: 69
Discharge: HOME | End: 2024-09-01
Attending: STUDENT IN AN ORGANIZED HEALTH CARE EDUCATION/TRAINING PROGRAM
Payer: MEDICARE

## 2024-09-01 VITALS
HEART RATE: 85 BPM | HEIGHT: 62 IN | SYSTOLIC BLOOD PRESSURE: 134 MMHG | DIASTOLIC BLOOD PRESSURE: 84 MMHG | OXYGEN SATURATION: 98 % | WEIGHT: 160 LBS | TEMPERATURE: 97.2 F | RESPIRATION RATE: 16 BRPM | BODY MASS INDEX: 29.44 KG/M2

## 2024-09-01 DIAGNOSIS — R21 RASH AND OTHER NONSPECIFIC SKIN ERUPTION: Primary | ICD-10-CM

## 2024-09-01 DIAGNOSIS — R42 LIGHTHEADEDNESS: ICD-10-CM

## 2024-09-01 LAB
ANION GAP SERPL CALCULATED.3IONS-SCNC: 14 MMOL/L (ref 7–16)
BASOPHILS ABSOLUTE: 0 K/CU MM
BASOPHILS RELATIVE PERCENT: 0.4 % (ref 0–1)
BUN SERPL-MCNC: 12 MG/DL (ref 6–23)
CALCIUM SERPL-MCNC: 9.6 MG/DL (ref 8.3–10.6)
CHLORIDE BLD-SCNC: 98 MMOL/L (ref 99–110)
CO2: 24 MMOL/L (ref 21–32)
CREAT SERPL-MCNC: 0.7 MG/DL (ref 0.6–1.1)
DIFFERENTIAL TYPE: ABNORMAL
EOSINOPHILS ABSOLUTE: 0 K/CU MM
EOSINOPHILS RELATIVE PERCENT: 0.8 % (ref 0–3)
GFR, ESTIMATED: >90 ML/MIN/1.73M2
GLUCOSE SERPL-MCNC: 125 MG/DL (ref 70–99)
HCT VFR BLD CALC: 37.3 % (ref 37–47)
HEMOGLOBIN: 12.3 GM/DL (ref 12.5–16)
IMMATURE NEUTROPHIL %: 0.4 % (ref 0–0.43)
LYMPHOCYTES ABSOLUTE: 1 K/CU MM
LYMPHOCYTES RELATIVE PERCENT: 19.9 % (ref 24–44)
MCH RBC QN AUTO: 30.3 PG (ref 27–31)
MCHC RBC AUTO-ENTMCNC: 33 % (ref 32–36)
MCV RBC AUTO: 91.9 FL (ref 78–100)
MONOCYTES ABSOLUTE: 0.5 K/CU MM
MONOCYTES RELATIVE PERCENT: 9.7 % (ref 0–4)
NEUTROPHILS ABSOLUTE: 3.5 K/CU MM
NEUTROPHILS RELATIVE PERCENT: 68.8 % (ref 36–66)
PDW BLD-RTO: 13.4 % (ref 11.7–14.9)
PLATELET # BLD: 252 K/CU MM (ref 140–440)
PMV BLD AUTO: 8.9 FL (ref 7.5–11.1)
POTASSIUM SERPL-SCNC: 4.4 MMOL/L (ref 3.5–5.1)
RBC # BLD: 4.06 M/CU MM (ref 4.2–5.4)
SODIUM BLD-SCNC: 136 MMOL/L (ref 135–145)
TOTAL IMMATURE NEUTOROPHIL: 0.02 K/CU MM
TROPONIN, HIGH SENSITIVITY: 8 NG/L (ref 0–14)
TROPONIN, HIGH SENSITIVITY: 8 NG/L (ref 0–14)
WBC # BLD: 5.1 K/CU MM (ref 4–10.5)

## 2024-09-01 PROCEDURE — 85025 COMPLETE CBC W/AUTO DIFF WBC: CPT

## 2024-09-01 PROCEDURE — 84484 ASSAY OF TROPONIN QUANT: CPT

## 2024-09-01 PROCEDURE — 6370000000 HC RX 637 (ALT 250 FOR IP): Performed by: STUDENT IN AN ORGANIZED HEALTH CARE EDUCATION/TRAINING PROGRAM

## 2024-09-01 PROCEDURE — 80048 BASIC METABOLIC PNL TOTAL CA: CPT

## 2024-09-01 PROCEDURE — 93005 ELECTROCARDIOGRAM TRACING: CPT | Performed by: STUDENT IN AN ORGANIZED HEALTH CARE EDUCATION/TRAINING PROGRAM

## 2024-09-01 PROCEDURE — 99284 EMERGENCY DEPT VISIT MOD MDM: CPT

## 2024-09-01 RX ORDER — DIPHENHYDRAMINE HCL 25 MG
25 TABLET ORAL ONCE
Status: COMPLETED | OUTPATIENT
Start: 2024-09-01 | End: 2024-09-01

## 2024-09-01 RX ORDER — TRIAMCINOLONE ACETONIDE 5 MG/G
CREAM TOPICAL
Qty: 15 G | Refills: 0 | Status: SHIPPED | OUTPATIENT
Start: 2024-09-01 | End: 2024-09-08

## 2024-09-01 RX ADMIN — DIPHENHYDRAMINE HYDROCHLORIDE 25 MG: 25 TABLET ORAL at 11:39

## 2024-09-01 ASSESSMENT — PAIN - FUNCTIONAL ASSESSMENT
PAIN_FUNCTIONAL_ASSESSMENT: 0-10
PAIN_FUNCTIONAL_ASSESSMENT: ACTIVITIES ARE NOT PREVENTED

## 2024-09-01 ASSESSMENT — PAIN DESCRIPTION - PAIN TYPE: TYPE: ACUTE PAIN

## 2024-09-01 ASSESSMENT — PAIN DESCRIPTION - LOCATION: LOCATION: OTHER (COMMENT)

## 2024-09-01 ASSESSMENT — PAIN DESCRIPTION - DESCRIPTORS: DESCRIPTORS: SORE

## 2024-09-01 NOTE — DISCHARGE INSTRUCTIONS
You can trial oral antihistamine such as Zyrtec Claritin or Allegra as an alternative to Benadryl if it makes you too sleepy or has any other side effects.

## 2024-09-01 NOTE — ED PROVIDER NOTES
Emergency Department Encounter    Patient: aMeve Bui  MRN: [id number]  : 1955  Date of Evaluation: 2024  ED Provider:  Sunil Nova DO    Triage Chief Complaint:   Rash (Painful red rash noted to bilateral rib area, worse on left. Several small raised bumps noted midline, no drainage. Pt reports pain \"deep\" behind rash. Also c/o itching initially but not currently. AOx4, breathing unlabored, skin warm and dry)    [place]:  Maeve Bui is a 69 y.o. female that presents with rash that involves her left abdomen and flank and right breast.  States initially started 3 days ago and came on suddenly but has been waxing and waning.  Reports yesterday it was nearly gone completely before returning.  They do report using some antifungal cream that they had leftover at home.  Reports that a few days ago she was feeling lightheaded.  Denies any chest pain or shortness of breath.  Has a previous history of breast cancer and has had left-sided mastectomy.  Currently on oral chemotherapy medication but this is not something new that she started.  Denies any new detergents or creams or any other chemical irritants.  Reports that it has been itchy with some pain as well.  No fevers nausea or vomiting.    MDM:    History from : Patient and Family patient's     Nontoxic-appearing.  Patient presents with a rash that is of unclear etiology.  It is a patchy diffuse area that involves the left abdomen and flank and also her right breast just inferiorly.  It is blanching.  No crepitus nontender to palpation, no vesicles present.  She has had mostly itching some pain but no persistent symptoms.  Given the resolution yesterday I have lower suspicion for infectious etiologies but I did consider this.  She has been vaccinated for shingles and overall the appearance is without any vesicles or severe pain and it does cross the midline.  I considered that shingles infection could still be possible as she is

## 2024-09-04 LAB
EKG ATRIAL RATE: 91 BPM
EKG DIAGNOSIS: NORMAL
EKG P AXIS: 51 DEGREES
EKG P-R INTERVAL: 148 MS
EKG Q-T INTERVAL: 350 MS
EKG QRS DURATION: 68 MS
EKG QTC CALCULATION (BAZETT): 430 MS
EKG R AXIS: -20 DEGREES
EKG T AXIS: 40 DEGREES
EKG VENTRICULAR RATE: 91 BPM

## 2024-09-04 PROCEDURE — 93010 ELECTROCARDIOGRAM REPORT: CPT | Performed by: INTERNAL MEDICINE

## 2024-09-14 NOTE — PROGRESS NOTES
Patient Name:  Maeve Bui  Patient :  1955  Patient MRN:  [id number]     Primary Oncologist: Koffi Najera MD  Referring Provider: No primary care provider on file.     Date of Service: 10/8/2024      Chief Complaint:    No chief complaint on file.     She came in for follow-up visit.    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 Previous mammogram was in 2016.  Diagnostic mammogram with US on 2021 showed at 1 o'clock left breast there was an irregular hypoechoic mass with associated calcifications and posterior acoustic shadowing measuring 1.4.x1.1x1.4 cm.  Impression: Highly suspicious mass 1 o'clock left breast and increased calcifications upper-outer quadrant left breast.    She had US guided biopsy on 2021.  Pathology report showed invasive ductal carcinoma with focal LCIS at posterior lateral tumor and 1 o'clock tumor.  CBC and CMP in  unremarkable, except for mildly elevated alkaline phosphatase at 116.  2021 CT CAP negative for metastatic disease.  2021 bone scan negative.    She elected to have left breast mastectomy followed by reconstruction.  She has left breast mastectomy with LN biopsy on 3/8/2021.  Pathology report showed : no residual cancer on mastectomy specimen.  Tumor focality: 2 sites.  Tumor site: Posterior lateral.  Tumor size: 4 mm.  Histologic type: Invasive ductal carcinoma.  Histologic grade: Grade 1  ER by IHC was more than 95%, IA by IHC was more than 95%, HER-2/mingo by IHC was 0.  Tumor site: 1:00  Tumor size: 5 mm  Histologic type: Invasive ductal carcinoma  Histologic grade: Grade 1  ER biopsy was more than 95%, IA by IHC was 50%, HER-2/mingo by FISH was not amplified.  Margins were clear.  No lymphovascular invasion.

## 2024-10-01 ENCOUNTER — HOSPITAL ENCOUNTER (OUTPATIENT)
Dept: INFUSION THERAPY | Age: 69
Discharge: HOME | End: 2024-10-01
Payer: MEDICARE

## 2024-10-01 DIAGNOSIS — D64.9 ANEMIA, UNSPECIFIED TYPE: ICD-10-CM

## 2024-10-01 LAB
ALBUMIN SERPL-MCNC: 4.3 G/DL (ref 3.4–5)
ALBUMIN/GLOB SERPL: 1.8 {RATIO} (ref 1.1–2.2)
ALP SERPL-CCNC: 82 U/L (ref 40–129)
ALT SERPL-CCNC: 29 U/L (ref 10–40)
ANION GAP SERPL CALCULATED.3IONS-SCNC: 10 MMOL/L (ref 9–17)
AST SERPL-CCNC: 28 U/L (ref 15–37)
BASOPHILS # BLD: 0.01 K/UL
BASOPHILS NFR BLD: 0 % (ref 0–1)
BILIRUB SERPL-MCNC: 0.3 MG/DL (ref 0–1)
BUN SERPL-MCNC: 14 MG/DL (ref 7–20)
CALCIUM SERPL-MCNC: 9.5 MG/DL (ref 8.3–10.6)
CHLORIDE SERPL-SCNC: 100 MMOL/L (ref 99–110)
CO2 SERPL-SCNC: 25 MMOL/L (ref 21–32)
CREAT SERPL-MCNC: 0.8 MG/DL (ref 0.6–1.2)
EOSINOPHIL # BLD: 0.08 K/UL
EOSINOPHILS RELATIVE PERCENT: 1 % (ref 0–3)
ERYTHROCYTE [DISTWIDTH] IN BLOOD BY AUTOMATED COUNT: 14.1 % (ref 11.7–14.9)
FERRITIN SERPL-MCNC: 44 NG/ML (ref 15–150)
GFR, ESTIMATED: 72 ML/MIN/1.73M2
GLUCOSE SERPL-MCNC: 149 MG/DL (ref 74–99)
HCT VFR BLD AUTO: 36.3 % (ref 37–47)
HGB BLD-MCNC: 12.1 G/DL (ref 12.5–16)
IRON SATN MFR SERPL: 27 % (ref 15–50)
IRON SERPL-MCNC: 99 UG/DL (ref 37–145)
LYMPHOCYTES NFR BLD: 1.63 K/UL
LYMPHOCYTES RELATIVE PERCENT: 27 % (ref 24–44)
MCH RBC QN AUTO: 31.7 PG (ref 27–31)
MCHC RBC AUTO-ENTMCNC: 33.3 G/DL (ref 32–36)
MCV RBC AUTO: 95 FL (ref 78–100)
MONOCYTES NFR BLD: 0.45 K/UL
MONOCYTES NFR BLD: 7 % (ref 0–4)
NEUTROPHILS NFR BLD: 65 % (ref 36–66)
NEUTS SEG NFR BLD: 3.99 K/UL
PLATELET # BLD AUTO: 284 K/UL (ref 140–440)
PMV BLD AUTO: 8.5 FL (ref 7.5–11.1)
POTASSIUM SERPL-SCNC: 4.6 MMOL/L (ref 3.5–5.1)
PROT SERPL-MCNC: 6.6 G/DL (ref 6.4–8.2)
RBC # BLD AUTO: 3.82 M/UL (ref 4.2–5.4)
SODIUM SERPL-SCNC: 135 MMOL/L (ref 136–145)
TIBC SERPL-MCNC: 363 UG/DL (ref 260–445)
UNSATURATED IRON BINDING CAPACITY: 264 UG/DL (ref 110–370)
WBC OTHER # BLD: 6.2 K/UL (ref 4–10.5)

## 2024-10-01 PROCEDURE — 85025 COMPLETE CBC W/AUTO DIFF WBC: CPT

## 2024-10-01 PROCEDURE — 82728 ASSAY OF FERRITIN: CPT

## 2024-10-01 PROCEDURE — 83550 IRON BINDING TEST: CPT

## 2024-10-01 PROCEDURE — 83540 ASSAY OF IRON: CPT

## 2024-10-01 PROCEDURE — 36415 COLL VENOUS BLD VENIPUNCTURE: CPT

## 2024-10-01 PROCEDURE — 80053 COMPREHEN METABOLIC PANEL: CPT

## 2024-10-08 ENCOUNTER — HOSPITAL ENCOUNTER (OUTPATIENT)
Dept: INFUSION THERAPY | Age: 69
Discharge: HOME | End: 2024-10-08
Payer: MEDICARE

## 2024-10-08 ENCOUNTER — OFFICE VISIT (OUTPATIENT)
Dept: ONCOLOGY | Age: 69
End: 2024-10-08
Payer: MEDICARE

## 2024-10-08 VITALS
HEART RATE: 104 BPM | OXYGEN SATURATION: 97 % | BODY MASS INDEX: 30.33 KG/M2 | HEIGHT: 62 IN | DIASTOLIC BLOOD PRESSURE: 72 MMHG | SYSTOLIC BLOOD PRESSURE: 157 MMHG | WEIGHT: 164.8 LBS | TEMPERATURE: 98 F

## 2024-10-08 DIAGNOSIS — M81.0 AGE-RELATED OSTEOPOROSIS WITHOUT CURRENT PATHOLOGICAL FRACTURE: ICD-10-CM

## 2024-10-08 DIAGNOSIS — D64.9 ANEMIA, UNSPECIFIED TYPE: Primary | ICD-10-CM

## 2024-10-08 DIAGNOSIS — R53.83 FATIGUE, UNSPECIFIED TYPE: ICD-10-CM

## 2024-10-08 DIAGNOSIS — Z78.0 MENOPAUSE: ICD-10-CM

## 2024-10-08 PROCEDURE — G8399 PT W/DXA RESULTS DOCUMENT: HCPCS | Performed by: INTERNAL MEDICINE

## 2024-10-08 PROCEDURE — 1090F PRES/ABSN URINE INCON ASSESS: CPT | Performed by: INTERNAL MEDICINE

## 2024-10-08 PROCEDURE — 1123F ACP DISCUSS/DSCN MKR DOCD: CPT | Performed by: INTERNAL MEDICINE

## 2024-10-08 PROCEDURE — G8484 FLU IMMUNIZE NO ADMIN: HCPCS | Performed by: INTERNAL MEDICINE

## 2024-10-08 PROCEDURE — 1036F TOBACCO NON-USER: CPT | Performed by: INTERNAL MEDICINE

## 2024-10-08 PROCEDURE — 3017F COLORECTAL CA SCREEN DOC REV: CPT | Performed by: INTERNAL MEDICINE

## 2024-10-08 PROCEDURE — G8417 CALC BMI ABV UP PARAM F/U: HCPCS | Performed by: INTERNAL MEDICINE

## 2024-10-08 PROCEDURE — G8427 DOCREV CUR MEDS BY ELIG CLIN: HCPCS | Performed by: INTERNAL MEDICINE

## 2024-10-08 PROCEDURE — 99213 OFFICE O/P EST LOW 20 MIN: CPT | Performed by: INTERNAL MEDICINE

## 2024-10-08 PROCEDURE — 99211 OFF/OP EST MAY X REQ PHY/QHP: CPT

## 2024-10-08 RX ORDER — ATORVASTATIN CALCIUM 40 MG/1
40 TABLET, FILM COATED ORAL NIGHTLY
COMMUNITY
Start: 2024-09-20

## 2024-10-08 RX ORDER — FLUOXETINE 40 MG/1
CAPSULE ORAL
COMMUNITY
Start: 2024-07-19

## 2024-10-08 RX ORDER — LANCETS 33 GAUGE
EACH MISCELLANEOUS
COMMUNITY
Start: 2024-08-29

## 2024-10-08 ASSESSMENT — PATIENT HEALTH QUESTIONNAIRE - PHQ9
SUM OF ALL RESPONSES TO PHQ QUESTIONS 1-9: 0
SUM OF ALL RESPONSES TO PHQ QUESTIONS 1-9: 0
2. FEELING DOWN, DEPRESSED OR HOPELESS: NOT AT ALL
1. LITTLE INTEREST OR PLEASURE IN DOING THINGS: NOT AT ALL
SUM OF ALL RESPONSES TO PHQ QUESTIONS 1-9: 0
SUM OF ALL RESPONSES TO PHQ9 QUESTIONS 1 & 2: 0
SUM OF ALL RESPONSES TO PHQ QUESTIONS 1-9: 0

## 2024-10-08 NOTE — PROGRESS NOTES
MA Rooming Questions  Patient: Maeve Bui  MRN: [id number]    Date: 10/8/2024        1. Do you have any new issues?   no         2. Do you need any refills on medications?    no    3. Have you had any imaging done since your last visit?   yes - labs 10/1    4. Have you been hospitalized or seen in the emergency room since your last visit here?   yes - ed 9/1    5. Did the patient have a depression screening completed today? Yes    PHQ-9 Total Score: 0 (10/8/2024 10:41 AM)       PHQ-9 Given to (if applicable):               PHQ-9 Score (if applicable):                     [] Positive     []  Negative              Does question #9 need addressed (if applicable)                     [] Yes    []  No               Jaz Beckett CMA

## 2024-10-20 DIAGNOSIS — M81.0 OSTEOPOROSIS, UNSPECIFIED OSTEOPOROSIS TYPE, UNSPECIFIED PATHOLOGICAL FRACTURE PRESENCE: ICD-10-CM

## 2024-10-20 DIAGNOSIS — M85.80 OSTEOPENIA, UNSPECIFIED LOCATION: ICD-10-CM

## 2024-10-21 RX ORDER — IBANDRONATE SODIUM 150 MG/1
TABLET, FILM COATED ORAL
Qty: 3 TABLET | Refills: 3 | OUTPATIENT
Start: 2024-10-21

## 2024-10-28 RX ORDER — ANASTROZOLE 1 MG/1
1 TABLET ORAL DAILY
Qty: 90 TABLET | Refills: 1 | Status: SHIPPED | OUTPATIENT
Start: 2024-10-28

## 2024-12-12 ENCOUNTER — TELEPHONE (OUTPATIENT)
Dept: INFUSION THERAPY | Age: 69
End: 2024-12-12

## 2024-12-15 NOTE — PROGRESS NOTES
Patient Name:  Maeve Bui  Patient :  1955  Patient MRN:  [id number]     Primary Oncologist: Koffi Najera MD  Referring Provider: No primary care provider on file.     Date of Service: 2025      Chief Complaint:    Chief Complaint   Patient presents with    Follow-up      She came in for follow-up visit.    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 Previous mammogram was in 2016.  Diagnostic mammogram with US on 2021 showed at 1 o'clock left breast there was an irregular hypoechoic mass with associated calcifications and posterior acoustic shadowing measuring 1.4.x1.1x1.4 cm.  Impression: Highly suspicious mass 1 o'clock left breast and increased calcifications upper-outer quadrant left breast.    She had US guided biopsy on 2021.  Pathology report showed invasive ductal carcinoma with focal LCIS at posterior lateral tumor and 1 o'clock tumor.  CBC and CMP in  unremarkable, except for mildly elevated alkaline phosphatase at 116.  2021 CT CAP negative for metastatic disease.  2021 bone scan negative.    She elected to have left breast mastectomy followed by reconstruction.  She has left breast mastectomy with LN biopsy on 3/8/2021.  Pathology report showed : no residual cancer on mastectomy specimen.  Tumor focality: 2 sites.  Tumor site: Posterior lateral.  Tumor size: 4 mm.  Histologic type: Invasive ductal carcinoma.  Histologic grade: Grade 1  ER by IHC was more than 95%, UT by IHC was more than 95%, HER-2/mingo by IHC was 0.  Tumor site: 1:00  Tumor size: 5 mm  Histologic type: Invasive ductal carcinoma  Histologic grade: Grade 1  ER biopsy was more than 95%, UT by IHC was 50%, HER-2/mingo by FISH was not amplified.  Margins were clear.  No

## 2024-12-17 ENCOUNTER — TELEPHONE (OUTPATIENT)
Dept: INFUSION THERAPY | Age: 69
End: 2024-12-17

## 2024-12-24 ENCOUNTER — TELEPHONE (OUTPATIENT)
Dept: ONCOLOGY | Age: 69
End: 2024-12-24

## 2024-12-24 DIAGNOSIS — R61 NIGHT SWEATS: ICD-10-CM

## 2024-12-24 RX ORDER — GABAPENTIN 300 MG/1
300 CAPSULE ORAL NIGHTLY
Qty: 90 CAPSULE | Refills: 3 | OUTPATIENT
Start: 2024-12-24

## 2024-12-24 NOTE — TELEPHONE ENCOUNTER
Called pt, left message informing her of her bone scan 1/2/25 with an arrival time of 10:15 am with no multivitamins or calcium supplements 24 hours prior to arrival.

## 2025-01-03 ENCOUNTER — HOSPITAL ENCOUNTER (OUTPATIENT)
Dept: INFUSION THERAPY | Age: 70
Discharge: HOME | End: 2025-01-03
Payer: MEDICARE

## 2025-01-03 DIAGNOSIS — D64.9 ANEMIA, UNSPECIFIED TYPE: ICD-10-CM

## 2025-01-03 DIAGNOSIS — R53.83 FATIGUE, UNSPECIFIED TYPE: ICD-10-CM

## 2025-01-03 LAB
FERRITIN SERPL-MCNC: 67 NG/ML (ref 15–150)
FOLATE SERPL-MCNC: 13.6 NG/ML (ref 4.8–24.2)
IRON SATN MFR SERPL: 31 % (ref 15–50)
IRON SERPL-MCNC: 95 UG/DL (ref 37–145)
TIBC SERPL-MCNC: 308 UG/DL (ref 260–445)
TSH SERPL DL<=0.05 MIU/L-ACNC: 1.6 UIU/ML (ref 0.27–4.2)
UNSATURATED IRON BINDING CAPACITY: 213 UG/DL (ref 110–370)
VIT B12 SERPL-MCNC: 430 PG/ML (ref 211–911)

## 2025-01-03 PROCEDURE — 83550 IRON BINDING TEST: CPT

## 2025-01-03 PROCEDURE — 84630 ASSAY OF ZINC: CPT

## 2025-01-03 PROCEDURE — 84443 ASSAY THYROID STIM HORMONE: CPT

## 2025-01-03 PROCEDURE — 82607 VITAMIN B-12: CPT

## 2025-01-03 PROCEDURE — 82746 ASSAY OF FOLIC ACID SERUM: CPT

## 2025-01-03 PROCEDURE — 82728 ASSAY OF FERRITIN: CPT

## 2025-01-03 PROCEDURE — 83540 ASSAY OF IRON: CPT

## 2025-01-03 PROCEDURE — 82525 ASSAY OF COPPER: CPT

## 2025-01-03 PROCEDURE — 36415 COLL VENOUS BLD VENIPUNCTURE: CPT

## 2025-01-05 LAB
COPPER SERPL-MCNC: 116.2 UG/DL (ref 80–155)
ZINC SERPL-MCNC: 68.7 UG/DL (ref 60–120)

## 2025-01-08 ENCOUNTER — HOSPITAL ENCOUNTER (OUTPATIENT)
Dept: INFUSION THERAPY | Age: 70
Discharge: HOME | End: 2025-01-08
Payer: MEDICARE

## 2025-01-08 ENCOUNTER — OFFICE VISIT (OUTPATIENT)
Dept: ONCOLOGY | Age: 70
End: 2025-01-08

## 2025-01-08 VITALS
SYSTOLIC BLOOD PRESSURE: 129 MMHG | WEIGHT: 157 LBS | HEIGHT: 62 IN | DIASTOLIC BLOOD PRESSURE: 72 MMHG | OXYGEN SATURATION: 98 % | BODY MASS INDEX: 28.89 KG/M2 | HEART RATE: 110 BPM | TEMPERATURE: 97.2 F

## 2025-01-08 DIAGNOSIS — D64.9 ANEMIA, UNSPECIFIED TYPE: Primary | ICD-10-CM

## 2025-01-08 PROCEDURE — 99211 OFF/OP EST MAY X REQ PHY/QHP: CPT

## 2025-01-08 RX ORDER — TIRZEPATIDE 5 MG/.5ML
INJECTION, SOLUTION SUBCUTANEOUS
COMMUNITY

## 2025-01-08 NOTE — PROGRESS NOTES
MA Rooming Questions  Patient: Maeve Bui  MRN: [id number]    Date: 1/8/2025        1. Do you have any new issues?   yes - Patient states wants to discuss the dexa scans.         2. Do you need any refills on medications?    no    3. Have you had any imaging done since your last visit?   yes - Dexa 12/30 and MRi 10/8    4. Have you been hospitalized or seen in the emergency room since your last visit here?   no    5. Did the patient have a depression screening completed today? No    No data recorded     PHQ-9 Given to (if applicable):               PHQ-9 Score (if applicable):                     [] Positive     []  Negative              Does question #9 need addressed (if applicable)                     [] Yes    []  No               Colleen Rdz MA

## 2025-01-31 DIAGNOSIS — R61 NIGHT SWEATS: ICD-10-CM

## 2025-01-31 RX ORDER — GABAPENTIN 300 MG/1
300 CAPSULE ORAL NIGHTLY
Qty: 90 CAPSULE | Refills: 3 | Status: SHIPPED | OUTPATIENT
Start: 2025-01-31 | End: 2026-01-31

## 2025-03-25 SDOH — ECONOMIC STABILITY: FOOD INSECURITY: WITHIN THE PAST 12 MONTHS, THE FOOD YOU BOUGHT JUST DIDN'T LAST AND YOU DIDN'T HAVE MONEY TO GET MORE.: NEVER TRUE

## 2025-03-25 SDOH — ECONOMIC STABILITY: INCOME INSECURITY: IN THE LAST 12 MONTHS, WAS THERE A TIME WHEN YOU WERE NOT ABLE TO PAY THE MORTGAGE OR RENT ON TIME?: NO

## 2025-03-25 SDOH — ECONOMIC STABILITY: FOOD INSECURITY: WITHIN THE PAST 12 MONTHS, YOU WORRIED THAT YOUR FOOD WOULD RUN OUT BEFORE YOU GOT MONEY TO BUY MORE.: NEVER TRUE

## 2025-03-26 ENCOUNTER — OFFICE VISIT (OUTPATIENT)
Dept: OBGYN | Age: 70
End: 2025-03-26
Payer: MEDICARE

## 2025-03-26 ENCOUNTER — HOSPITAL ENCOUNTER (OUTPATIENT)
Age: 70
Setting detail: SPECIMEN
Discharge: HOME | End: 2025-03-26
Payer: MEDICARE

## 2025-03-26 VITALS
SYSTOLIC BLOOD PRESSURE: 127 MMHG | BODY MASS INDEX: 27.05 KG/M2 | DIASTOLIC BLOOD PRESSURE: 88 MMHG | HEIGHT: 62 IN | WEIGHT: 147 LBS | HEART RATE: 117 BPM

## 2025-03-26 DIAGNOSIS — Z85.3 PERSONAL HISTORY OF BREAST CANCER: ICD-10-CM

## 2025-03-26 DIAGNOSIS — M85.80 OSTEOPENIA, UNSPECIFIED LOCATION: ICD-10-CM

## 2025-03-26 DIAGNOSIS — N89.8 VAGINAL DISCHARGE: ICD-10-CM

## 2025-03-26 DIAGNOSIS — Z01.419 WOMEN'S ANNUAL ROUTINE GYNECOLOGICAL EXAMINATION: Primary | ICD-10-CM

## 2025-03-26 PROCEDURE — G0123 SCREEN CERV/VAG THIN LAYER: HCPCS

## 2025-03-26 PROCEDURE — G0101 CA SCREEN;PELVIC/BREAST EXAM: HCPCS | Performed by: NURSE PRACTITIONER

## 2025-03-26 RX ORDER — IBANDRONATE SODIUM 150 MG/1
150 TABLET, FILM COATED ORAL
Qty: 3 TABLET | Refills: 3 | Status: SHIPPED | OUTPATIENT
Start: 2025-03-26

## 2025-03-26 ASSESSMENT — ENCOUNTER SYMPTOMS
CONSTIPATION: 0
VOMITING: 0
GASTROINTESTINAL NEGATIVE: 1
SHORTNESS OF BREATH: 0
RESPIRATORY NEGATIVE: 1
NAUSEA: 0
ABDOMINAL PAIN: 0
DIARRHEA: 0

## 2025-03-26 ASSESSMENT — PATIENT HEALTH QUESTIONNAIRE - PHQ9
1. LITTLE INTEREST OR PLEASURE IN DOING THINGS: NOT AT ALL
SUM OF ALL RESPONSES TO PHQ QUESTIONS 1-9: 0
2. FEELING DOWN, DEPRESSED OR HOPELESS: NOT AT ALL
SUM OF ALL RESPONSES TO PHQ QUESTIONS 1-9: 0

## 2025-03-26 NOTE — PROGRESS NOTES
3/26/25    Maeve Bui  1955    Chief Complaint   Patient presents with    Annual Exam     Annual exam. Non-smoker. No known h/o dvt. Patient is postmenopausal is not on hrt . Patient is sexually active. Pap Smear was  per pt and results were negative, HPV not ordered. Patient had a recent mammogram  which was negative for malignancy. Patient had bone density scan in  per pt revealing Osteopenia using boniva, no longer showing osteoporosis. Colonoscopy 2021 normal. Patient complains of vaginal discharge, yellow in color w/ odor. Requesting pap smear.     Other     Pt reports she is in oral chemo; receives a diagnostic mammogram first week of April.         The patient is a 69 y.o. female,  who presents for her annual exam.  She is menopausal.  She is not taking HRT..  She is  sexually active.    She reports additional symptoms of vaginal discharge/irritation.      Pap smear history: Her last PAP smear was in .  Her results were normal.    Breast history: her most recent mammogram was in .  The results were: Normal  Pt alternates MRI and Mammogram Q 6 months    Osteoporosis Status: her bone density scan was in .  The results were osteopenia - treatment: Boniva    Colonoscopy Status: she had a colonoscopy in .  The results were normal.    Past Medical History:   Diagnosis Date    Arthritis     Cancer (HCC) 2021    L breast     Depression     GERD (gastroesophageal reflux disease)     History of blood transfusion     Hyperlipidemia     Osteopenia     Osteoporosis     Pap smear for cervical cancer screening 2009    Neg    Pap smear for cervical cancer screening 2010    Neg    Pap smear for cervical cancer screening 2011    Neg    Rectocele        Past Surgical History:   Procedure Laterality Date    BREAST BIOPSY      COLONOSCOPY  2008    COLONOSCOPY N/A 2021    COLONOSCOPY CONTROL HEMORRHAGE performed by Sy Florentino MD at [facility] OR

## 2025-03-27 LAB
BV BACTERIA DNA VAG QL NAA+PROBE: NOT DETECTED
C GLABRATA DNA VAG QL NAA+PROBE: NORMAL
C GLABRATA DNA VAG QL NAA+PROBE: NOT DETECTED
C KRUSEI DNA VAG QL NAA+PROBE: NOT DETECTED
CANDIDA DNA VAG QL NAA+PROBE: NOT DETECTED
T VAGINALIS DNA VAG QL NAA+PROBE: NOT DETECTED

## 2025-04-01 ENCOUNTER — HOSPITAL ENCOUNTER (OUTPATIENT)
Dept: INFUSION THERAPY | Age: 70
Discharge: HOME | End: 2025-04-01
Payer: MEDICARE

## 2025-04-01 DIAGNOSIS — D64.9 ANEMIA, UNSPECIFIED TYPE: ICD-10-CM

## 2025-04-01 LAB
ALBUMIN SERPL-MCNC: 4.6 G/DL (ref 3.4–5)
ALBUMIN/GLOB SERPL: 1.6 {RATIO} (ref 1.1–2.2)
ALP SERPL-CCNC: 83 U/L (ref 40–129)
ALT SERPL-CCNC: 20 U/L (ref 10–40)
ANION GAP SERPL CALCULATED.3IONS-SCNC: 14 MMOL/L (ref 9–17)
AST SERPL-CCNC: 28 U/L (ref 15–37)
BASOPHILS # BLD: 0.02 K/UL
BASOPHILS NFR BLD: 0 % (ref 0–1)
BILIRUB SERPL-MCNC: 0.3 MG/DL (ref 0–1)
BUN SERPL-MCNC: 10 MG/DL (ref 7–20)
CALCIUM SERPL-MCNC: 9.8 MG/DL (ref 8.3–10.6)
CHLORIDE SERPL-SCNC: 97 MMOL/L (ref 99–110)
CO2 SERPL-SCNC: 24 MMOL/L (ref 21–32)
CREAT SERPL-MCNC: 0.9 MG/DL (ref 0.6–1.2)
EOSINOPHIL # BLD: 0.04 K/UL
EOSINOPHILS RELATIVE PERCENT: 1 % (ref 0–3)
ERYTHROCYTE [DISTWIDTH] IN BLOOD BY AUTOMATED COUNT: 13.3 % (ref 11.7–14.9)
FERRITIN SERPL-MCNC: 89 NG/ML (ref 15–150)
GFR, ESTIMATED: 66 ML/MIN/1.73M2
GLUCOSE SERPL-MCNC: 125 MG/DL (ref 74–99)
HCT VFR BLD AUTO: 39.7 % (ref 37–47)
HGB BLD-MCNC: 13.1 G/DL (ref 12.5–16)
IRON SATN MFR SERPL: 19 % (ref 15–50)
IRON SERPL-MCNC: 61 UG/DL (ref 37–145)
LYMPHOCYTES NFR BLD: 1.74 K/UL
LYMPHOCYTES RELATIVE PERCENT: 25 % (ref 24–44)
MCH RBC QN AUTO: 30.4 PG (ref 27–31)
MCHC RBC AUTO-ENTMCNC: 33 G/DL (ref 32–36)
MCV RBC AUTO: 92.1 FL (ref 78–100)
MONOCYTES NFR BLD: 0.54 K/UL
MONOCYTES NFR BLD: 8 % (ref 0–4)
NEUTROPHILS NFR BLD: 66 % (ref 36–66)
NEUTS SEG NFR BLD: 4.63 K/UL
PLATELET # BLD AUTO: 318 K/UL (ref 140–440)
PMV BLD AUTO: 8.7 FL (ref 7.5–11.1)
POTASSIUM SERPL-SCNC: 4.2 MMOL/L (ref 3.5–5.1)
PROT SERPL-MCNC: 7.4 G/DL (ref 6.4–8.2)
RBC # BLD AUTO: 4.31 M/UL (ref 4.2–5.4)
SODIUM SERPL-SCNC: 135 MMOL/L (ref 136–145)
TIBC SERPL-MCNC: 325 UG/DL (ref 260–445)
UNSATURATED IRON BINDING CAPACITY: 264 UG/DL (ref 110–370)
WBC OTHER # BLD: 7 K/UL (ref 4–10.5)

## 2025-04-01 PROCEDURE — 80053 COMPREHEN METABOLIC PANEL: CPT

## 2025-04-01 PROCEDURE — 83540 ASSAY OF IRON: CPT

## 2025-04-01 PROCEDURE — 85025 COMPLETE CBC W/AUTO DIFF WBC: CPT

## 2025-04-01 PROCEDURE — 83550 IRON BINDING TEST: CPT

## 2025-04-01 PROCEDURE — 36415 COLL VENOUS BLD VENIPUNCTURE: CPT

## 2025-04-01 PROCEDURE — 82728 ASSAY OF FERRITIN: CPT

## 2025-04-04 LAB — GYNECOLOGY CYTOLOGY REPORT: NORMAL

## 2025-04-08 ENCOUNTER — HOSPITAL ENCOUNTER (OUTPATIENT)
Dept: INFUSION THERAPY | Age: 70
Discharge: HOME | End: 2025-04-08
Payer: MEDICARE

## 2025-04-08 ENCOUNTER — OFFICE VISIT (OUTPATIENT)
Dept: ONCOLOGY | Age: 70
End: 2025-04-08
Payer: MEDICARE

## 2025-04-08 VITALS
SYSTOLIC BLOOD PRESSURE: 143 MMHG | HEART RATE: 105 BPM | DIASTOLIC BLOOD PRESSURE: 78 MMHG | HEIGHT: 62 IN | WEIGHT: 144.4 LBS | OXYGEN SATURATION: 100 % | TEMPERATURE: 98 F | BODY MASS INDEX: 26.57 KG/M2

## 2025-04-08 DIAGNOSIS — D64.9 ANEMIA, UNSPECIFIED TYPE: Primary | ICD-10-CM

## 2025-04-08 PROCEDURE — 1123F ACP DISCUSS/DSCN MKR DOCD: CPT | Performed by: INTERNAL MEDICINE

## 2025-04-08 PROCEDURE — 1159F MED LIST DOCD IN RCRD: CPT | Performed by: INTERNAL MEDICINE

## 2025-04-08 PROCEDURE — 99213 OFFICE O/P EST LOW 20 MIN: CPT | Performed by: INTERNAL MEDICINE

## 2025-04-08 PROCEDURE — G8427 DOCREV CUR MEDS BY ELIG CLIN: HCPCS | Performed by: INTERNAL MEDICINE

## 2025-04-08 PROCEDURE — 99212 OFFICE O/P EST SF 10 MIN: CPT

## 2025-04-08 PROCEDURE — 1126F AMNT PAIN NOTED NONE PRSNT: CPT | Performed by: INTERNAL MEDICINE

## 2025-04-08 PROCEDURE — 3017F COLORECTAL CA SCREEN DOC REV: CPT | Performed by: INTERNAL MEDICINE

## 2025-04-08 PROCEDURE — G8417 CALC BMI ABV UP PARAM F/U: HCPCS | Performed by: INTERNAL MEDICINE

## 2025-04-08 PROCEDURE — 1090F PRES/ABSN URINE INCON ASSESS: CPT | Performed by: INTERNAL MEDICINE

## 2025-04-08 PROCEDURE — 1036F TOBACCO NON-USER: CPT | Performed by: INTERNAL MEDICINE

## 2025-04-08 PROCEDURE — G8399 PT W/DXA RESULTS DOCUMENT: HCPCS | Performed by: INTERNAL MEDICINE

## 2025-04-08 NOTE — PROGRESS NOTES
MA Rooming Questions  Patient: Maeve Bui  MRN: [id number]    Date: 4/8/2025        1. Do you have any new issues?   no         2. Do you need any refills on medications?    no    3. Have you had any imaging done since your last visit?   yes - labs 4/1    4. Have you been hospitalized or seen in the emergency room since your last visit here?   no    5. Did the patient have a depression screening completed today? No    No data recorded     PHQ-9 Given to (if applicable):               PHQ-9 Score (if applicable):                     [] Positive     []  Negative              Does question #9 need addressed (if applicable)                     [] Yes    []  No               Jaz Beckett CMA      
gastritis and colonoscopy with Dr Spann.  1/2024 capsule endoscopic study was OK.  3/25/2024 CMP and CBC grossly wnl except for .   Ferritin has improved at 73, Iron 88, TIBC 353, sat 25.   10/1/2024 creat 0.8, LFTs wnl. WBC 6.2, Hg 12.1, plat 284. Ferritin 44, TIBC 363, sat 27.  I recommend to cont with Slow Fe QD.   1/3/2025 zinc wnl. Folate 13.6, B-12 430. Copper wnl. TSH wnl.   Ferritin 67, Iron 95, TIBC 308, sat 31.   I recommend to cont with Slow Fe QOD.  4/1/25 creat 0.9. LFTs wnl. CBC wnl. Ferritin 89, Iron 61, TIBC 325, sat 19.   She takes oral iron QOD.   Repeat labs prior to next OV.    Weight loss on Mounjaro.    We discussed about healthy diet and life style. She had Covid vaccine.    RTC in 6 months or sooner. All of her questions have been answered for today.

## 2025-04-14 ENCOUNTER — PATIENT MESSAGE (OUTPATIENT)
Dept: OBGYN | Age: 70
End: 2025-04-14

## 2025-04-14 RX ORDER — ANASTROZOLE 1 MG/1
1 TABLET ORAL DAILY
Qty: 90 TABLET | Refills: 1 | Status: SHIPPED | OUTPATIENT
Start: 2025-04-14

## 2025-04-25 ENCOUNTER — OFFICE VISIT (OUTPATIENT)
Dept: OBGYN | Age: 70
End: 2025-04-25

## 2025-04-25 VITALS
DIASTOLIC BLOOD PRESSURE: 74 MMHG | WEIGHT: 144.4 LBS | HEART RATE: 113 BPM | SYSTOLIC BLOOD PRESSURE: 130 MMHG | BODY MASS INDEX: 26.57 KG/M2 | HEIGHT: 62 IN

## 2025-04-25 DIAGNOSIS — N89.8 VAGINAL ODOR: ICD-10-CM

## 2025-04-25 DIAGNOSIS — N89.8 VAGINAL DISCHARGE: Primary | ICD-10-CM

## 2025-04-25 ASSESSMENT — ENCOUNTER SYMPTOMS
SHORTNESS OF BREATH: 0
GASTROINTESTINAL NEGATIVE: 1
DIARRHEA: 0
NAUSEA: 0
CONSTIPATION: 0
ABDOMINAL PAIN: 0
VOMITING: 0
RESPIRATORY NEGATIVE: 1

## 2025-04-25 NOTE — PROGRESS NOTES
4/25/25    Maeve Bui  1955    Chief Complaint   Patient presents with    Vaginal Discharge     Reports vaginal discharge. Patient describes the discharge as yellow, staining underwear. Symptoms present x 1 month.  Associated symptoms include odor. Pt denies itching and burning.         Maeve Bui is a 69 y.o. female who presents today for evaluation of see above.    Past Medical History:   Diagnosis Date    Arthritis     Cancer (HCC) 01/2021    L breast     Depression     GERD (gastroesophageal reflux disease)     History of blood transfusion     Hyperlipidemia     Osteopenia     Osteoporosis     Pap smear for cervical cancer screening 01/08/2009    Neg    Pap smear for cervical cancer screening 01/28/2010    Neg    Pap smear for cervical cancer screening 07/14/2011    Neg    Rectocele        Past Surgical History:   Procedure Laterality Date    BREAST BIOPSY      COLONOSCOPY  11/2008    COLONOSCOPY N/A 9/29/2021    COLONOSCOPY CONTROL HEMORRHAGE performed by [facility] Fl[facility]ino MD at [facility] OR    DILATION AND CURETTAGE OF UTERUS  1985    JOINT REPLACEMENT Left 2019    partial knee    KNEE SURGERY      MASTECTOMY, PARTIAL Left 03/08/2021    PELVIC LAPAROSCOPY  1985    TUBAL LIGATION  1991    UPPER GASTROINTESTINAL ENDOSCOPY N/A 9/29/2021    EGD BIOPSY performed by Sy Florentino MD at [facility] OR       Social History     Tobacco Use    Smoking status: Never    Smokeless tobacco: Never   Vaping Use    Vaping status: Never Used   Substance Use Topics    Alcohol use: Yes    Drug use: No       Family History   Problem Relation Age of Onset    Heart Failure Father     Heart Disease Father     High Cholesterol Father     Coronary Art Dis Father     Arthritis Father     Stroke Mother     Heart Failure Maternal Grandmother     Hypertension Maternal Grandmother     High Blood Pressure Maternal Grandmother     Cancer Paternal Grandmother     Diabetes Paternal Grandmother     Arthritis Paternal Aunt        Current

## 2025-04-26 LAB
C TRACH DNA SPEC QL NAA+PROBE: NOT DETECTED
CANDIDA RRNA VAG QL PROBE: NOT DETECTED
CANDIDA RRNA VAG QL PROBE: NOT DETECTED
CARBAPENEM RESISTANCE OXA-48 GENE BY PCR: NOT DETECTED
CEPHALOSPORIN RESISTANCE AMPC GENE: NOT DETECTED
ESBL RESISTANCE: NOT DETECTED
G VAGINALIS RRNA GENITAL QL PROBE: NOT DETECTED
M HOMINIS DNA BLD QL NAA+PROBE: NOT DETECTED
MACROLIDE RESISTANCE: NOT DETECTED
METHICILLIN RESISTANCE: NOT DETECTED
MYCOPLASMA DNA SPEC QL NAA+PROBE: NOT DETECTED
N GONORRHOEA DNA SPEC QL NAA+PROBE: NOT DETECTED
OTHER MICROORG DNA SPEC QL NAA+PROBE: NOT DETECTED
OTHER MICROORG DNA SPEC QL NAA+PROBE: NOT DETECTED
QUINOLONE AND FLUOROQUINOLONE RESISTANCE: NOT DETECTED
T VAGINALIS RRNA SPEC QL NAA+PROBE: NOT DETECTED
TETRACYCLINE RESISTANCE: NOT DETECTED
TRIMETHOPRIM/SULFONAMIDE RESISTANCE: NOT DETECTED

## 2025-04-27 ENCOUNTER — RESULTS FOLLOW-UP (OUTPATIENT)
Dept: OBGYN | Age: 70
End: 2025-04-27

## 2025-05-01 DIAGNOSIS — M85.80 OSTEOPENIA, UNSPECIFIED LOCATION: ICD-10-CM

## 2025-05-01 RX ORDER — IBANDRONATE SODIUM 150 MG/1
TABLET, FILM COATED ORAL
Qty: 3 TABLET | Refills: 3 | OUTPATIENT
Start: 2025-05-01

## 2025-05-29 ENCOUNTER — TELEPHONE (OUTPATIENT)
Dept: OBGYN | Age: 70
End: 2025-05-29

## 2025-05-29 DIAGNOSIS — M85.80 OSTEOPENIA, UNSPECIFIED LOCATION: ICD-10-CM

## 2025-05-29 NOTE — TELEPHONE ENCOUNTER
Pt requesting refill on boniva. I spoke with the pharmacy and they stated they don't have that medication on file for this patient. Can yo please resend the prescription to her listed pharmacy.

## 2025-05-30 RX ORDER — IBANDRONATE SODIUM 150 MG/1
150 TABLET, FILM COATED ORAL
Qty: 3 TABLET | Refills: 3 | Status: SHIPPED | OUTPATIENT
Start: 2025-05-30

## (undated) DEVICE — Z DISCONTINUED NO SUB IDED TUBING ETCO2 AD L6.5FT NSL ORAL CVD PRNG NONFLARED TIP OVR

## (undated) DEVICE — FORCEPS BX L240CM JAW DIA2.8MM L CAP W/ NDL MIC MESH TOOTH

## (undated) DEVICE — ENDOSCOPY KIT: Brand: MEDLINE INDUSTRIES, INC.

## (undated) DEVICE — FIAPC® PROBE W/ FILTER 2200 C OD 2.3MM/6.9FR; L 2.2M/7.2FT: Brand: ERBE

## (undated) DEVICE — Z DISCONTINUED (USE MFG CAT MVABO)  TUBING GAS SAMPLING STD 6.5 FT FEMALE CONN SMRT CAPNOLINE